# Patient Record
Sex: FEMALE | Race: WHITE | HISPANIC OR LATINO | Employment: PART TIME | URBAN - METROPOLITAN AREA
[De-identification: names, ages, dates, MRNs, and addresses within clinical notes are randomized per-mention and may not be internally consistent; named-entity substitution may affect disease eponyms.]

---

## 2017-02-06 ENCOUNTER — HOSPITAL ENCOUNTER (OUTPATIENT)
Facility: HOSPITAL | Age: 47
Discharge: HOME | End: 2017-02-07
Attending: FAMILY MEDICINE | Admitting: FAMILY MEDICINE
Payer: MEDICARE

## 2017-02-06 DIAGNOSIS — E86.0 DEHYDRATION: ICD-10-CM

## 2017-02-06 PROBLEM — R07.9 CHEST PAIN AT REST: Status: ACTIVE | Noted: 2017-02-06

## 2017-02-06 PROBLEM — D72.829 LEUKOCYTOSIS: Status: ACTIVE | Noted: 2017-02-06

## 2017-02-06 PROBLEM — I95.9 HYPOTENSION: Status: ACTIVE | Noted: 2017-02-06

## 2017-02-06 LAB
ALBUMIN SERPL BCP-MCNC: 2.9 G/DL
ALP SERPL-CCNC: 77 U/L
ALT SERPL W/O P-5'-P-CCNC: 32 U/L
ANION GAP SERPL CALC-SCNC: 13 MMOL/L
AST SERPL-CCNC: 23 U/L
BASOPHILS # BLD AUTO: 0.01 K/UL
BASOPHILS NFR BLD: 0 %
BILIRUB SERPL-MCNC: 0.3 MG/DL
BILIRUB UR QL STRIP: ABNORMAL
BUN SERPL-MCNC: 15 MG/DL
CALCIUM SERPL-MCNC: 8.2 MG/DL
CHLORIDE SERPL-SCNC: 110 MMOL/L
CK MB SERPL-MCNC: 1.8 NG/ML
CK MB SERPL-RTO: 1.9 %
CK SERPL-CCNC: 97 U/L
CLARITY UR: CLEAR
CO2 SERPL-SCNC: 11 MMOL/L
COLOR UR: YELLOW
CREAT SERPL-MCNC: 0.8 MG/DL
DIFFERENTIAL METHOD: ABNORMAL
EOSINOPHIL # BLD AUTO: 0 K/UL
EOSINOPHIL NFR BLD: 0 %
ERYTHROCYTE [DISTWIDTH] IN BLOOD BY AUTOMATED COUNT: 13.7 %
EST. GFR  (AFRICAN AMERICAN): >60 ML/MIN/1.73 M^2
EST. GFR  (NON AFRICAN AMERICAN): >60 ML/MIN/1.73 M^2
GLUCOSE SERPL-MCNC: 139 MG/DL
GLUCOSE UR QL STRIP: ABNORMAL
HCT VFR BLD AUTO: 42.7 %
HGB BLD-MCNC: 13.9 G/DL
HGB UR QL STRIP: NEGATIVE
KETONES UR QL STRIP: ABNORMAL
LDH SERPL L TO P-CCNC: 129 U/L
LDH SERPL L TO P-CCNC: 201 U/L
LEUKOCYTE ESTERASE UR QL STRIP: NEGATIVE
LYMPHOCYTES # BLD AUTO: 1.3 K/UL
LYMPHOCYTES NFR BLD: 5.6 %
MCH RBC QN AUTO: 30.6 PG
MCHC RBC AUTO-ENTMCNC: 32.6 %
MCV RBC AUTO: 94 FL
MONOCYTES # BLD AUTO: 0.9 K/UL
MONOCYTES NFR BLD: 3.9 %
NEUTROPHILS # BLD AUTO: 21.1 K/UL
NEUTROPHILS NFR BLD: 90.5 %
NITRITE UR QL STRIP: NEGATIVE
PH UR STRIP: 6 [PH] (ref 5–8)
PLATELET # BLD AUTO: 272 K/UL
PLATELET BLD QL SMEAR: ABNORMAL
PMV BLD AUTO: 10.3 FL
POCT GLUCOSE: 116 MG/DL (ref 70–110)
POCT GLUCOSE: 145 MG/DL (ref 70–110)
POTASSIUM SERPL-SCNC: 3.9 MMOL/L
PROT SERPL-MCNC: 6.1 G/DL
PROT UR QL STRIP: ABNORMAL
RBC # BLD AUTO: 4.54 M/UL
SODIUM SERPL-SCNC: 134 MMOL/L
SP GR UR STRIP: >=1.03 (ref 1–1.03)
TROPONIN I SERPL DL<=0.01 NG/ML-MCNC: <0.006 NG/ML
URN SPEC COLLECT METH UR: ABNORMAL
UROBILINOGEN UR STRIP-ACNC: NEGATIVE EU/DL
WBC # BLD AUTO: 23.53 K/UL

## 2017-02-06 PROCEDURE — 83036 HEMOGLOBIN GLYCOSYLATED A1C: CPT

## 2017-02-06 PROCEDURE — 93010 ELECTROCARDIOGRAM REPORT: CPT | Mod: ,,, | Performed by: INTERNAL MEDICINE

## 2017-02-06 PROCEDURE — 63600175 PHARM REV CODE 636 W HCPCS: Performed by: FAMILY MEDICINE

## 2017-02-06 PROCEDURE — 84484 ASSAY OF TROPONIN QUANT: CPT

## 2017-02-06 PROCEDURE — 82553 CREATINE MB FRACTION: CPT

## 2017-02-06 PROCEDURE — 94761 N-INVAS EAR/PLS OXIMETRY MLT: CPT

## 2017-02-06 PROCEDURE — G0378 HOSPITAL OBSERVATION PER HR: HCPCS

## 2017-02-06 PROCEDURE — G0379 DIRECT REFER HOSPITAL OBSERV: HCPCS

## 2017-02-06 PROCEDURE — 87040 BLOOD CULTURE FOR BACTERIA: CPT

## 2017-02-06 PROCEDURE — 25000003 PHARM REV CODE 250: Performed by: FAMILY MEDICINE

## 2017-02-06 PROCEDURE — 93005 ELECTROCARDIOGRAM TRACING: CPT

## 2017-02-06 PROCEDURE — 85025 COMPLETE CBC W/AUTO DIFF WBC: CPT

## 2017-02-06 PROCEDURE — 80053 COMPREHEN METABOLIC PANEL: CPT

## 2017-02-06 PROCEDURE — 81003 URINALYSIS AUTO W/O SCOPE: CPT

## 2017-02-06 PROCEDURE — 36415 COLL VENOUS BLD VENIPUNCTURE: CPT

## 2017-02-06 PROCEDURE — 83615 LACTATE (LD) (LDH) ENZYME: CPT

## 2017-02-06 RX ORDER — METFORMIN HYDROCHLORIDE 1000 MG/1
1000 TABLET ORAL 2 TIMES DAILY WITH MEALS
COMMUNITY

## 2017-02-06 RX ORDER — METFORMIN HYDROCHLORIDE 500 MG/1
1000 TABLET ORAL 2 TIMES DAILY WITH MEALS
Status: DISCONTINUED | OUTPATIENT
Start: 2017-02-06 | End: 2017-02-07 | Stop reason: HOSPADM

## 2017-02-06 RX ORDER — CLOZAPINE 100 MG/1
150 TABLET ORAL 2 TIMES DAILY
Status: ON HOLD | COMMUNITY
End: 2017-02-07

## 2017-02-06 RX ORDER — FENOFIBRATE 145 MG/1
145 TABLET, FILM COATED ORAL
Status: DISCONTINUED | OUTPATIENT
Start: 2017-02-07 | End: 2017-02-07 | Stop reason: HOSPADM

## 2017-02-06 RX ORDER — GLUCAGON 1 MG
1 KIT INJECTION
Status: CANCELLED | OUTPATIENT
Start: 2017-02-06

## 2017-02-06 RX ORDER — INSULIN ASPART 100 [IU]/ML
1-10 INJECTION, SOLUTION INTRAVENOUS; SUBCUTANEOUS
Status: DISCONTINUED | OUTPATIENT
Start: 2017-02-06 | End: 2017-02-07 | Stop reason: HOSPADM

## 2017-02-06 RX ORDER — GABAPENTIN 300 MG/1
300 CAPSULE ORAL 3 TIMES DAILY
Status: DISCONTINUED | OUTPATIENT
Start: 2017-02-06 | End: 2017-02-07 | Stop reason: HOSPADM

## 2017-02-06 RX ORDER — INSULIN GLARGINE 100 [IU]/ML
10 INJECTION, SOLUTION SUBCUTANEOUS
COMMUNITY

## 2017-02-06 RX ORDER — IBUPROFEN 200 MG
16 TABLET ORAL
Status: CANCELLED | OUTPATIENT
Start: 2017-02-06

## 2017-02-06 RX ORDER — ONDANSETRON 2 MG/ML
4 INJECTION INTRAMUSCULAR; INTRAVENOUS EVERY 12 HOURS PRN
Status: CANCELLED | OUTPATIENT
Start: 2017-02-06

## 2017-02-06 RX ORDER — CLOZAPINE 25 MG/1
150 TABLET ORAL 2 TIMES DAILY
Status: DISCONTINUED | OUTPATIENT
Start: 2017-02-06 | End: 2017-02-07

## 2017-02-06 RX ORDER — CITALOPRAM 40 MG/1
40 TABLET, FILM COATED ORAL DAILY
COMMUNITY

## 2017-02-06 RX ORDER — IBUPROFEN 200 MG
24 TABLET ORAL
Status: CANCELLED | OUTPATIENT
Start: 2017-02-06

## 2017-02-06 RX ORDER — PANTOPRAZOLE SODIUM 40 MG/1
40 TABLET, DELAYED RELEASE ORAL DAILY
Status: DISCONTINUED | OUTPATIENT
Start: 2017-02-06 | End: 2017-02-07 | Stop reason: HOSPADM

## 2017-02-06 RX ORDER — LOPERAMIDE HYDROCHLORIDE 2 MG/1
4 CAPSULE ORAL ONCE
Status: COMPLETED | OUTPATIENT
Start: 2017-02-06 | End: 2017-02-06

## 2017-02-06 RX ORDER — FENOFIBRATE 160 MG/1
145 TABLET ORAL
Status: ON HOLD | COMMUNITY
End: 2018-08-13 | Stop reason: HOSPADM

## 2017-02-06 RX ORDER — LOPERAMIDE HYDROCHLORIDE 2 MG/1
4 CAPSULE ORAL ONCE
Status: CANCELLED | OUTPATIENT
Start: 2017-02-06 | End: 2017-02-06

## 2017-02-06 RX ORDER — FUROSEMIDE 20 MG/1
20 TABLET ORAL DAILY
Status: DISCONTINUED | OUTPATIENT
Start: 2017-02-06 | End: 2017-02-07

## 2017-02-06 RX ORDER — METOPROLOL TARTRATE 25 MG/1
25 TABLET, FILM COATED ORAL 2 TIMES DAILY
COMMUNITY

## 2017-02-06 RX ORDER — ONDANSETRON 2 MG/ML
4 INJECTION INTRAMUSCULAR; INTRAVENOUS EVERY 12 HOURS PRN
Status: DISCONTINUED | OUTPATIENT
Start: 2017-02-06 | End: 2017-02-07

## 2017-02-06 RX ORDER — PANTOPRAZOLE SODIUM 20 MG/1
20 TABLET, DELAYED RELEASE ORAL
COMMUNITY

## 2017-02-06 RX ORDER — BENZTROPINE MESYLATE 1 MG/1
1 TABLET ORAL 2 TIMES DAILY
COMMUNITY

## 2017-02-06 RX ORDER — IBUPROFEN 200 MG
24 TABLET ORAL
Status: DISCONTINUED | OUTPATIENT
Start: 2017-02-06 | End: 2017-02-07 | Stop reason: HOSPADM

## 2017-02-06 RX ORDER — GABAPENTIN 300 MG/1
300 CAPSULE ORAL 3 TIMES DAILY
COMMUNITY

## 2017-02-06 RX ORDER — CITALOPRAM 20 MG/1
40 TABLET, FILM COATED ORAL DAILY
Status: DISCONTINUED | OUTPATIENT
Start: 2017-02-07 | End: 2017-02-07 | Stop reason: HOSPADM

## 2017-02-06 RX ORDER — BENZTROPINE MESYLATE 1 MG/1
1 TABLET ORAL 2 TIMES DAILY
Status: DISCONTINUED | OUTPATIENT
Start: 2017-02-06 | End: 2017-02-07 | Stop reason: HOSPADM

## 2017-02-06 RX ORDER — GLUCAGON 1 MG
1 KIT INJECTION
Status: DISCONTINUED | OUTPATIENT
Start: 2017-02-06 | End: 2017-02-07 | Stop reason: HOSPADM

## 2017-02-06 RX ORDER — ATORVASTATIN CALCIUM 40 MG/1
40 TABLET, FILM COATED ORAL DAILY
Status: DISCONTINUED | OUTPATIENT
Start: 2017-02-06 | End: 2017-02-07 | Stop reason: HOSPADM

## 2017-02-06 RX ORDER — IBUPROFEN 200 MG
16 TABLET ORAL
Status: DISCONTINUED | OUTPATIENT
Start: 2017-02-06 | End: 2017-02-07 | Stop reason: HOSPADM

## 2017-02-06 RX ORDER — LISINOPRIL 10 MG/1
10 TABLET ORAL DAILY
Status: DISCONTINUED | OUTPATIENT
Start: 2017-02-06 | End: 2017-02-07

## 2017-02-06 RX ORDER — FENOFIBRATE 145 MG/1
145 TABLET, FILM COATED ORAL DAILY
Status: DISCONTINUED | OUTPATIENT
Start: 2017-02-06 | End: 2017-02-06

## 2017-02-06 RX ORDER — LISINOPRIL 10 MG/1
10 TABLET ORAL DAILY
Status: ON HOLD | COMMUNITY
End: 2017-02-07 | Stop reason: HOSPADM

## 2017-02-06 RX ORDER — CLOZAPINE 25 MG/1
25 TABLET ORAL NIGHTLY
Status: DISCONTINUED | OUTPATIENT
Start: 2017-02-06 | End: 2017-02-06

## 2017-02-06 RX ORDER — METOPROLOL TARTRATE 25 MG/1
25 TABLET, FILM COATED ORAL DAILY
Status: DISCONTINUED | OUTPATIENT
Start: 2017-02-06 | End: 2017-02-07 | Stop reason: HOSPADM

## 2017-02-06 RX ORDER — GLUCAGON 1 MG
1 KIT INJECTION
Status: DISCONTINUED | OUTPATIENT
Start: 2017-02-06 | End: 2017-02-06 | Stop reason: SDUPTHER

## 2017-02-06 RX ORDER — ATORVASTATIN CALCIUM 40 MG/1
40 TABLET, FILM COATED ORAL NIGHTLY
COMMUNITY

## 2017-02-06 RX ORDER — FUROSEMIDE 20 MG/1
20 TABLET ORAL DAILY
Status: ON HOLD | COMMUNITY
End: 2017-02-07 | Stop reason: HOSPADM

## 2017-02-06 RX ORDER — BUTALBITAL, ACETAMINOPHEN AND CAFFEINE 50; 325; 40 MG/1; MG/1; MG/1
1 TABLET ORAL EVERY 6 HOURS PRN
Status: DISCONTINUED | OUTPATIENT
Start: 2017-02-06 | End: 2017-02-07 | Stop reason: HOSPADM

## 2017-02-06 RX ORDER — SODIUM CHLORIDE 9 MG/ML
INJECTION, SOLUTION INTRAVENOUS CONTINUOUS
Status: ACTIVE | OUTPATIENT
Start: 2017-02-06 | End: 2017-02-07

## 2017-02-06 RX ORDER — SODIUM CHLORIDE 9 MG/ML
INJECTION, SOLUTION INTRAVENOUS CONTINUOUS
Status: DISCONTINUED | OUTPATIENT
Start: 2017-02-06 | End: 2017-02-07 | Stop reason: HOSPADM

## 2017-02-06 RX ADMIN — LOPERAMIDE HYDROCHLORIDE 4 MG: 2 CAPSULE ORAL at 02:02

## 2017-02-06 RX ADMIN — LISINOPRIL 10 MG: 10 TABLET ORAL at 02:02

## 2017-02-06 RX ADMIN — SODIUM CHLORIDE: 0.9 INJECTION, SOLUTION INTRAVENOUS at 10:02

## 2017-02-06 RX ADMIN — BUTALBITAL, ACETAMINOPHEN, AND CAFFEINE 1 TABLET: 50; 325; 40 TABLET ORAL at 10:02

## 2017-02-06 RX ADMIN — SODIUM CHLORIDE 500 ML: 0.9 INJECTION, SOLUTION INTRAVENOUS at 06:02

## 2017-02-06 RX ADMIN — PANTOPRAZOLE SODIUM 40 MG: 40 TABLET, DELAYED RELEASE ORAL at 02:02

## 2017-02-06 RX ADMIN — SODIUM CHLORIDE 500 ML: 0.9 INJECTION, SOLUTION INTRAVENOUS at 02:02

## 2017-02-06 RX ADMIN — INSULIN DETEMIR 10 UNITS: 100 INJECTION, SOLUTION SUBCUTANEOUS at 10:02

## 2017-02-06 RX ADMIN — GABAPENTIN 300 MG: 300 CAPSULE ORAL at 10:02

## 2017-02-06 RX ADMIN — SODIUM CHLORIDE: 0.9 INJECTION, SOLUTION INTRAVENOUS at 06:02

## 2017-02-06 RX ADMIN — ATORVASTATIN CALCIUM 40 MG: 40 TABLET, FILM COATED ORAL at 02:02

## 2017-02-06 RX ADMIN — BENZTROPINE MESYLATE 1 MG: 1 TABLET ORAL at 09:02

## 2017-02-06 RX ADMIN — METOPROLOL TARTRATE 25 MG: 25 TABLET ORAL at 02:02

## 2017-02-06 RX ADMIN — METFORMIN HYDROCHLORIDE 1000 MG: 500 TABLET, FILM COATED ORAL at 06:02

## 2017-02-06 RX ADMIN — BUTALBITAL, ACETAMINOPHEN, AND CAFFEINE 1 TABLET: 50; 325; 40 TABLET ORAL at 03:02

## 2017-02-06 RX ADMIN — FUROSEMIDE 20 MG: 20 TABLET ORAL at 02:02

## 2017-02-06 RX ADMIN — GABAPENTIN 300 MG: 300 CAPSULE ORAL at 03:02

## 2017-02-06 NOTE — H&P
"[facility]  History & Physical    Subjective: "I am dizzy, my stomach hurts, I have diatrrhea and I am not able to keep anything down"       Chief Complaint/Reason for Admission: Unable to keep food and drink down dehydrated, chest pain, intermittent, 5/10, lasting 10 min, on rest. Diarrhea, nausea, vomiting.    Therese Ann is a 46 y.o. female with h/o DM on Lantus and Metformin, history of developmental delay. Insulin swince 2015. DM complications: PVD, neuropathy, and CAD    No past medical history on file.DM, HTN, hyperlipidaemia, MV prolapse, Checkenpox, blood transfusions,   No past surgical history on file.  No family history on file.Cancer, stroke, DM, CAD  Social History   Substance Use Topics    Smoking status: Not on file    Smokeless tobacco: Not on file    Alcohol use Not on file       No prescriptions prior to admission.     Review of patient's allergies indicates:  Allergies not on file     Review of Systems   Constitutional: Positive for diaphoresis, fever and malaise/fatigue.   Respiratory: Negative.    Cardiovascular: Positive for chest pain and palpitations.   Gastrointestinal: Positive for abdominal pain, diarrhea, nausea and vomiting.   Genitourinary: Negative.    Musculoskeletal: Negative.    Neurological: Positive for dizziness and weakness. Negative for tingling, tremors, sensory change, speech change, focal weakness, seizures and loss of consciousness.   Endo/Heme/Allergies: Negative.    Psychiatric/Behavioral: Negative for depression, hallucinations, substance abuse and suicidal ideas. The patient is not nervous/anxious and does not have insomnia.        Objective:      Vital Signs (Most Recent)       Vital Signs Range (Last 24H):  BP: ()/()   Arterial Line BP: ()/()     Physical Exam   Constitutional: She is oriented to person, place, and time. She appears well-developed and well-nourished.   HENT:   Head: Normocephalic and atraumatic.   Eyes: EOM are normal. " Pupils are equal, round, and reactive to light.   Neck: Normal range of motion. Neck supple.   Cardiovascular: Regular rhythm, normal heart sounds, intact distal pulses and normal pulses.  Tachycardia present.    Abdominal: Soft. Bowel sounds are normal. There is tenderness in the periumbilical area.       Musculoskeletal: Normal range of motion.   Neurological: She is alert and oriented to person, place, and time. She displays a negative Romberg sign.   Reflex Scores:       Tricep reflexes are 0 on the right side and 0 on the left side.       Bicep reflexes are 0 on the right side and 0 on the left side.       Brachioradialis reflexes are 1+ on the right side and 1+ on the left side.       Patellar reflexes are 1+ on the right side and 1+ on the left side.       Achilles reflexes are 1+ on the right side and 1+ on the left side.  Skin: Skin is warm. She is diaphoretic.   Psychiatric: She has a normal mood and affect. Her behavior is normal. Judgment and thought content normal.   Vitals reviewed.      Data Review:    CBC:   Lab Results   Component Value Date    WBC 10.43 09/29/2004    RBC 4.35 09/29/2004    HGB 13.6 09/29/2004    HCT 39.9 09/29/2004     09/29/2004     BMP:   Lab Results   Component Value Date     (H) 09/29/2004     09/29/2004    K 4.5 09/29/2004     09/29/2004    CO2 20 (L) 09/29/2004    BUN 12 09/29/2004    CREATININE 0.7 09/29/2004    CALCIUM 9.7 09/29/2004      ECG: normal sinus rhythm, no blocks or conduction defects, no ischemic changes. Pending    Assessment:      There are no hospital problems to display for this patient.      Plan:    Admit for observation, CE q8 with 12 lead ECG, rehydrate, DM controll, antispasmodics and pain controll, vitals stabilization

## 2017-02-06 NOTE — IP AVS SNAPSHOT
[facility]  [address]  [place] [address]  Phone: [phone]           Patient Discharge Instructions     Our goal is to set you up for success. This packet includes information on your condition, medications, and your home care. It will help you to care for yourself so you don't get sicker and need to go back to the hospital.     Please ask your nurse if you have any questions.        There are many details to remember when preparing to leave the hospital. Here is what you will need to do:    1. Take your medicine. If you are prescribed medications, review your Medication List in the following pages. You may have new medications to  at the pharmacy and others that you'll need to stop taking. Review the instructions for how and when to take your medications. Talk with your doctor or nurses if you are unsure of what to do.     2. Go to your follow-up appointments. Specific follow-up information is listed in the following pages. Your may be contacted by a transition nurse or clinical provider about future appointments. Be sure we have all of the phone numbers to reach you, if needed. Please contact your provider's office if you are unable to make an appointment.     3. Watch for warning signs. Your doctor or nurse will give you detailed warning signs to watch for and when to call for assistance. These instructions may also include educational information about your condition. If you experience any of warning signs to your health, call your doctor.               ** Verify the list of medication(s) below is accurate and up to date. Carry this with you in case of emergency. If your medications have changed, please notify your healthcare provider.             Medication List      START taking these medications        Additional Info                      butalbital-acetaminophen-caffeine -40 mg -40 mg per tablet   Commonly known as:  FIORICET, ESGIC   Quantity:  30 tablet    Refills:  0   Dose:  1 tablet    Last time this was given:  1 tablet on 2/7/2017 11:26 AM   Instructions:  Take 1 tablet by mouth every 6 (six) hours as needed for Headaches.     Begin Date    AM    Noon    PM    Bedtime       ondansetron 4 MG Tbdl   Commonly known as:  ZOFRAN-ODT   Quantity:  20 tablet   Refills:  0   Dose:  4 mg    Instructions:  Take 1 tablet (4 mg total) by mouth every 6 (six) hours as needed.     Begin Date    AM    Noon    PM    Bedtime         CHANGE how you take these medications        Additional Info                      * clozapine 100 MG Tab   Commonly known as:  CLOZARIL   Quantity:  60 tablet   Refills:  1   Dose:  100 mg   What changed:  how much to take    Instructions:  Take 1 tablet (100 mg total) by mouth 2 (two) times daily.     Begin Date    AM    Noon    PM    Bedtime       * clozapine 100 MG Tab   Commonly known as:  CLOZARIL   Quantity:  60 tablet   Refills:  11   Dose:  100 mg   What changed:  You were already taking a medication with the same name, and this prescription was added. Make sure you understand how and when to take each.    Instructions:  Take 1 tablet (100 mg total) by mouth 2 (two) times daily.     Begin Date    AM    Noon    PM    Bedtime       lisinopril 5 MG tablet   Commonly known as:  PRINIVIL,ZESTRIL   Quantity:  90 tablet   Refills:  3   Dose:  5 mg   What changed:    - medication strength  - how much to take    Start Date:  2/8/2017   Last time this was given:  10 mg on 2/6/2017  2:30 PM   Instructions:  Take 1 tablet (5 mg total) by mouth once daily.     Begin Date    AM    Noon    PM    Bedtime       * Notice:  This list has 2 medication(s) that are the same as other medications prescribed for you. Read the directions carefully, and ask your doctor or other care provider to review them with you.      CONTINUE taking these medications        Additional Info                      atorvastatin 40 MG tablet   Commonly known as:  LIPITOR   Refills:  0    Dose:  40 mg    Last time this was given:  40 mg on 2/7/2017  8:49 AM   Instructions:  Take 40 mg by mouth every evening.     Begin Date    AM    Noon    PM    Bedtime       benztropine 1 MG tablet   Commonly known as:  COGENTIN   Refills:  0   Dose:  1 mg    Last time this was given:  1 mg on 2/7/2017  8:49 AM   Instructions:  Take 1 mg by mouth 2 (two) times daily.     Begin Date    AM    Noon    PM    Bedtime       citalopram 40 MG tablet   Commonly known as:  CELEXA   Refills:  0   Dose:  40 mg    Last time this was given:  40 mg on 2/7/2017  8:49 AM   Instructions:  Take 40 mg by mouth once daily.     Begin Date    AM    Noon    PM    Bedtime       fenofibrate 160 MG Tab   Refills:  0   Dose:  145 mg    Instructions:  Take 145 mg by mouth daily with lunch.     Begin Date    AM    Noon    PM    Bedtime       gabapentin 300 MG capsule   Commonly known as:  NEURONTIN   Refills:  0   Dose:  300 mg    Last time this was given:  300 mg on 2/7/2017  5:24 AM   Instructions:  Take 300 mg by mouth 3 (three) times daily.     Begin Date    AM    Noon    PM    Bedtime       insulin glargine 100 unit/mL injection   Commonly known as:  LANTUS   Refills:  0   Dose:  10 Units    Instructions:  Inject 10 Units into the skin before breakfast.     Begin Date    AM    Noon    PM    Bedtime       metformin 1000 MG tablet   Commonly known as:  GLUCOPHAGE   Refills:  0   Dose:  1000 mg    Last time this was given:  1,000 mg on 2/7/2017  8:48 AM   Instructions:  Take 1,000 mg by mouth 2 (two) times daily with meals.     Begin Date    AM    Noon    PM    Bedtime       metoprolol tartrate 25 MG tablet   Commonly known as:  LOPRESSOR   Refills:  0   Dose:  25 mg    Last time this was given:  25 mg on 2/6/2017  2:30 PM   Instructions:  Take 25 mg by mouth 2 (two) times daily.     Begin Date    AM    Noon    PM    Bedtime       pantoprazole 20 MG tablet   Commonly known as:  PROTONIX   Refills:  0   Dose:  20 mg    Last time this was  given:  40 mg on 2/7/2017  8:49 AM   Instructions:  Take 20 mg by mouth before breakfast.     Begin Date    AM    Noon    PM    Bedtime         STOP taking these medications     furosemide 20 MG tablet   Commonly known as:  LASIX            Where to Get Your Medications      These medications were sent to [facility] - [place] - [address] [facility].  [address] [place] [address]., [place] [address]     Phone:  [phone]     butalbital-acetaminophen-caffeine -40 mg -40 mg per tablet    clozapine 100 MG Tab    clozapine 100 MG Tab    lisinopril 5 MG tablet    ondansetron 4 MG Tbdl                  Please bring to all follow up appointments:    1. A copy of your discharge instructions.  2. All medicines you are currently taking in their original bottles.  3. Identification and insurance card.    Please arrive 15 minutes ahead of scheduled appointment time.    Please call 24 hours in advance if you must reschedule your appointment and/or time.        Follow-up Information     Follow up with Yaniv Mehta MD On 2/10/2017.    Specialty:  Family Medicine    Why:  11:30 am         Contact information:    [address] [facility]  [address]  [place] [address]  [phone]          Discharge Instructions     Future Orders    Activity as tolerated     Diet general     Comments:    Diabetic, Cardiac diet, low salt diet    Questions:    Total calories:  1800 Calorie    Fat restriction, if any:      Protein restriction, if any:      Na restriction, if any:      Fluid restriction:      Additional restrictions:      No dressing needed         Primary Diagnosis     Your primary diagnosis was:  Dehydration      Admission Information     Date & Time Provider Department [facility]    2/6/2017 11:51 AM Yaniv Webster MD [facility] [id number]      Care Providers     Provider Role Specialty Primary office phone    Yaniv Webster MD Attending Provider Family  "Medicine [phone]    Cynthia Thomas MD Consulting Physician  Psychiatry [phone]      Your Vitals Were     BP Pulse Temp Resp Height Weight    94/51 (BP Location: Left arm, Patient Position: Lying, BP Method: Automatic) 95 97.6 °F (36.4 °C) (Oral) 18 5' 2" (1.575 m) 78.9 kg (174 lb)    SpO2 BMI             96% 31.83 kg/m2         Recent Lab Values        2/6/2017                          12:52 PM           A1C 9.6 (H)           Comment for A1C at 12:52 PM on 2/6/2017:  According to ADA guidelines, hemoglobin A1C <7.0% represents  optimal control in non-pregnant diabetic patients.  Different  metrics may apply to specific populations.   Standards of Medical Care in Diabetes - 2016.  For the purpose of screening for the presence of diabetes:  <5.7%     Consistent with the absence of diabetes  5.7-6.4%  Consistent with increasing risk for diabetes   (prediabetes)  >or=6.5%  Consistent with diabetes  Currently no consensus exists for use of hemoglobin A1C  for diagnosis of diabetes for children.        Pending Labs     Order Current Status    Blood culture (site 1) Preliminary result      Allergies as of 2/7/2017     No Known Allergies      [facility]     [facility] Nurse Care Line - 24/7 Assistance  Unless otherwise directed by your provider, please contact [facility], our nurse care line that is available for 24/7 assistance.     Registered nurses in the [facility] provide clinical advisement, health education, appointment booking, and other advisory services.  Call for this free service at [phone].        Advance Directives     An advance directive is a document which, in the event you are no longer able to make decisions for yourself, tells your healthcare team what kind of treatment you do or do not want to receive, or who you would like to make those decisions for you.  If you do not currently have an advance directive, [facility] encourages you to create one.  For more " information call:  [phone]), [phone] ([phone]),  or log on to [email or web address].        Smoking Cessation     If you would like to quit smoking:   You may be eligible for free services if you are a [place] resident and started smoking cigarettes before September 1, 1988.  Call the Smoking Cessation Trust (SCT) toll free at (949) 964-9622 or (075) 270-7297.   Call 4-216-QUIT-NOW if you do not meet the above criteria.            Language Assistance Services     ATTENTION: Language assistance services are available, free of charge. Please call [phone].      ATENCIÓN: Si lexy noe, tiene a ortega disposición servicios gratuitos de asistencia lingüística. Llame al [phone].     CHÚ Ý: N?u b?n nói Ti?ng Vi?t, có các d?ch v? h? tr? ngôn ng? mi?n phí dành cho b?n. G?i s? [phone].        [facility] Sign-Up     Activating your [facility] account is as easy as 1-2-3!     1) Visit [email or web address], select Sign Up Now, enter this activation code and your date of birth, then select Next.  [email or web address]  Expires: 3/24/2017 12:45 PM      2) Create a username and password to use when you visit [facility] in the future and select a security question in case you lose your password and select Next.    3) Enter your e-mail address and click Sign Up!    Additional Information  If you have questions, please e-mail [email or web address] or call [phone] to talk to our [facility] staff. Remember, [facility] is NOT to be used for urgent needs. For medical emergencies, dial 911.          [facility] complies with applicable Federal civil rights laws and does not discriminate on the basis of race, color, national origin, age, disability, or sex.

## 2017-02-07 VITALS
HEART RATE: 54 BPM | BODY MASS INDEX: 32.02 KG/M2 | HEIGHT: 62 IN | OXYGEN SATURATION: 97 % | TEMPERATURE: 98 F | WEIGHT: 174 LBS | DIASTOLIC BLOOD PRESSURE: 66 MMHG | RESPIRATION RATE: 18 BRPM | SYSTOLIC BLOOD PRESSURE: 144 MMHG

## 2017-02-07 PROBLEM — D72.829 LEUKOCYTOSIS: Status: RESOLVED | Noted: 2017-02-06 | Resolved: 2017-02-07

## 2017-02-07 PROBLEM — E86.0 DEHYDRATION: Status: RESOLVED | Noted: 2017-02-06 | Resolved: 2017-02-07

## 2017-02-07 LAB
BASOPHILS # BLD AUTO: 0 K/UL
BASOPHILS NFR BLD: 0 %
CK MB SERPL-MCNC: 1.3 NG/ML
CK MB SERPL-MCNC: 1.5 NG/ML
CK MB SERPL-RTO: 1.9 %
CK MB SERPL-RTO: 1.9 %
CK SERPL-CCNC: 70 U/L
CK SERPL-CCNC: 79 U/L
DIFFERENTIAL METHOD: ABNORMAL
EOSINOPHIL # BLD AUTO: 0 K/UL
EOSINOPHIL NFR BLD: 0.1 %
ERYTHROCYTE [DISTWIDTH] IN BLOOD BY AUTOMATED COUNT: 14.2 %
ESTIMATED AVG GLUCOSE: 229 MG/DL
HBA1C MFR BLD HPLC: 9.6 %
HCT VFR BLD AUTO: 36.3 %
HGB BLD-MCNC: 11.8 G/DL
LDH SERPL L TO P-CCNC: 138 U/L
LYMPHOCYTES # BLD AUTO: 0.8 K/UL
LYMPHOCYTES NFR BLD: 8.6 %
MAGNESIUM SERPL-MCNC: 2.2 MG/DL
MCH RBC QN AUTO: 30.2 PG
MCHC RBC AUTO-ENTMCNC: 32.5 %
MCV RBC AUTO: 93 FL
MONOCYTES # BLD AUTO: 0.8 K/UL
MONOCYTES NFR BLD: 7.9 %
NEUTROPHILS # BLD AUTO: 7.9 K/UL
NEUTROPHILS NFR BLD: 83.4 %
PHOSPHATE SERPL-MCNC: 1.5 MG/DL
PLATELET # BLD AUTO: 227 K/UL
PLATELET BLD QL SMEAR: ABNORMAL
PMV BLD AUTO: 10.6 FL
POCT GLUCOSE: 115 MG/DL (ref 70–110)
POCT GLUCOSE: 155 MG/DL (ref 70–110)
POCT GLUCOSE: 97 MG/DL (ref 70–110)
RBC # BLD AUTO: 3.91 M/UL
TROPONIN I SERPL DL<=0.01 NG/ML-MCNC: <0.006 NG/ML
TROPONIN I SERPL DL<=0.01 NG/ML-MCNC: <0.006 NG/ML
WBC # BLD AUTO: 9.48 K/UL

## 2017-02-07 PROCEDURE — 25000003 PHARM REV CODE 250: Performed by: FAMILY MEDICINE

## 2017-02-07 PROCEDURE — 84484 ASSAY OF TROPONIN QUANT: CPT | Mod: 91

## 2017-02-07 PROCEDURE — 84100 ASSAY OF PHOSPHORUS: CPT

## 2017-02-07 PROCEDURE — 94761 N-INVAS EAR/PLS OXIMETRY MLT: CPT

## 2017-02-07 PROCEDURE — G0378 HOSPITAL OBSERVATION PER HR: HCPCS

## 2017-02-07 PROCEDURE — 36415 COLL VENOUS BLD VENIPUNCTURE: CPT

## 2017-02-07 PROCEDURE — 83615 LACTATE (LD) (LDH) ENZYME: CPT

## 2017-02-07 PROCEDURE — 82553 CREATINE MB FRACTION: CPT

## 2017-02-07 PROCEDURE — 63600175 PHARM REV CODE 636 W HCPCS: Performed by: FAMILY MEDICINE

## 2017-02-07 PROCEDURE — 83735 ASSAY OF MAGNESIUM: CPT

## 2017-02-07 PROCEDURE — 85025 COMPLETE CBC W/AUTO DIFF WBC: CPT

## 2017-02-07 PROCEDURE — 82553 CREATINE MB FRACTION: CPT | Mod: 91

## 2017-02-07 PROCEDURE — 93005 ELECTROCARDIOGRAM TRACING: CPT

## 2017-02-07 PROCEDURE — 93010 ELECTROCARDIOGRAM REPORT: CPT | Mod: ,,, | Performed by: INTERNAL MEDICINE

## 2017-02-07 PROCEDURE — 84484 ASSAY OF TROPONIN QUANT: CPT

## 2017-02-07 RX ORDER — LISINOPRIL 5 MG/1
5 TABLET ORAL DAILY
Qty: 90 TABLET | Refills: 3 | Status: SHIPPED | OUTPATIENT
Start: 2017-02-08 | End: 2018-10-01

## 2017-02-07 RX ORDER — CLOZAPINE 100 MG/1
100 TABLET ORAL 2 TIMES DAILY
Qty: 60 TABLET | Refills: 11 | Status: SHIPPED | OUTPATIENT
Start: 2017-02-07 | End: 2018-02-07

## 2017-02-07 RX ORDER — ONDANSETRON 4 MG/1
4 TABLET, ORALLY DISINTEGRATING ORAL EVERY 6 HOURS PRN
Qty: 20 TABLET | Refills: 0 | Status: SHIPPED | OUTPATIENT
Start: 2017-02-07

## 2017-02-07 RX ORDER — CLOZAPINE 25 MG/1
150 TABLET ORAL NIGHTLY
Status: DISCONTINUED | OUTPATIENT
Start: 2017-02-07 | End: 2017-02-07 | Stop reason: HOSPADM

## 2017-02-07 RX ORDER — CLOZAPINE 25 MG/1
100 TABLET ORAL 2 TIMES DAILY
Status: DISCONTINUED | OUTPATIENT
Start: 2017-02-07 | End: 2017-02-07 | Stop reason: HOSPADM

## 2017-02-07 RX ORDER — LISINOPRIL 5 MG/1
5 TABLET ORAL DAILY
Status: DISCONTINUED | OUTPATIENT
Start: 2017-02-08 | End: 2017-02-07 | Stop reason: HOSPADM

## 2017-02-07 RX ORDER — BUTALBITAL, ACETAMINOPHEN AND CAFFEINE 50; 325; 40 MG/1; MG/1; MG/1
1 TABLET ORAL EVERY 6 HOURS PRN
Qty: 30 TABLET | Refills: 0 | Status: SHIPPED | OUTPATIENT
Start: 2017-02-07 | End: 2017-03-09

## 2017-02-07 RX ORDER — ONDANSETRON 4 MG/1
4 TABLET, ORALLY DISINTEGRATING ORAL EVERY 6 HOURS PRN
Status: DISCONTINUED | OUTPATIENT
Start: 2017-02-07 | End: 2017-02-07 | Stop reason: HOSPADM

## 2017-02-07 RX ORDER — CLOZAPINE 100 MG/1
100 TABLET ORAL 2 TIMES DAILY
Qty: 60 TABLET | Refills: 1 | Status: SHIPPED | OUTPATIENT
Start: 2017-02-07

## 2017-02-07 RX ADMIN — BUTALBITAL, ACETAMINOPHEN, AND CAFFEINE 1 TABLET: 50; 325; 40 TABLET ORAL at 11:02

## 2017-02-07 RX ADMIN — ATORVASTATIN CALCIUM 40 MG: 40 TABLET, FILM COATED ORAL at 08:02

## 2017-02-07 RX ADMIN — FENOFIBRATE 145 MG: 145 TABLET ORAL at 11:02

## 2017-02-07 RX ADMIN — CITALOPRAM HYDROBROMIDE 40 MG: 20 TABLET ORAL at 08:02

## 2017-02-07 RX ADMIN — BENZTROPINE MESYLATE 1 MG: 1 TABLET ORAL at 08:02

## 2017-02-07 RX ADMIN — BUTALBITAL, ACETAMINOPHEN, AND CAFFEINE 1 TABLET: 50; 325; 40 TABLET ORAL at 05:02

## 2017-02-07 RX ADMIN — SODIUM CHLORIDE 125 ML/HR: 0.9 INJECTION, SOLUTION INTRAVENOUS at 05:02

## 2017-02-07 RX ADMIN — PANTOPRAZOLE SODIUM 40 MG: 40 TABLET, DELAYED RELEASE ORAL at 08:02

## 2017-02-07 RX ADMIN — GABAPENTIN 300 MG: 300 CAPSULE ORAL at 05:02

## 2017-02-07 RX ADMIN — METFORMIN HYDROCHLORIDE 1000 MG: 500 TABLET, FILM COATED ORAL at 08:02

## 2017-02-07 RX ADMIN — ONDANSETRON 4 MG: 2 INJECTION INTRAMUSCULAR; INTRAVENOUS at 09:02

## 2017-02-07 NOTE — PLAN OF CARE
[place] met with pt and son Canelo Castellano  [phone]   pt independent prior to admit, drives     lives with son     f/u with Dr. Webster  2/10/16 -- 11:30 am       pt states she regularly checks blood sugar - insulin is expensive but pt is able to buy it - advised to check prices at [facility] for generic Relion.       gave drug discount card   pt has ride to home today         02/07/17 1152   Discharge Assessment   Assessment Type Discharge Planning Assessment   Confirmed/corrected address and phone number on facesheet? Yes   Assessment information obtained from? Patient;Medical Record   Expected Length of Stay (days) 1   Communicated expected length of stay with patient/caregiver yes   Type of Healthcare Directive Received (no lw    )   Prior to hospitilization cognitive status: Alert/Oriented   Prior to hospitalization functional status: Independent   Current cognitive status: Alert/Oriented   Current Functional Status: Independent   Arrived From home or self-care   Lives With child(jamie), adult  (son Canelo Castellano   [phone] )   Able to Return to Prior Arrangements no   Is patient able to care for self after discharge? Yes   How many people do you have in your home that can help with your care after discharge? 1   Who are your caregiver(s) and their phone number(s)? (leslie Lemos    )   Patient's perception of discharge disposition home or selfcare   Readmission Within The Last 30 Days no previous admission in last 30 days   Patient currently being followed by outpatient case management? No   Patient currently receives home health services? No   Does the patient currently use HME? Yes   Equipment Currently Used at Home cane, straight   Do you have any problems affording any of your prescribed medications? No  (states insulin is expensive - gave discount card    )   Is the patient taking medications as prescribed? yes   Do you have any financial concerns preventing you from receiving the healthcare you need? No   Does the  patient have transportation to healthcare appointments? Yes   Transportation Available family or friend will provide;car   On Dialysis? No   Does the patient receive services at the Coumadin Clinic? No   Are there any open cases? No   Discharge Plan A Home;Home with family   Discharge Plan B Home;Home with family;Home Health   Patient/Family In Agreement With Plan yes

## 2017-02-07 NOTE — DISCHARGE SUMMARY
[facility] Hospital Medicine  Discharge Summary      Patient Name: Therese Ann  MRN: [id number]  Admission Date: 2/6/2017  Hospital Length of Stay: 0 days  Discharge Date and Time:  02/07/2017 10:18 AM  Attending Physician: Evita Bonds, *   Discharging Provider: Evita Mehta MD  Primary Care Provider: Evita Mehta MD      HPI:  Admitted for dehydration-, hemoconcentration, hyponanatraemia, tachycardia, hypotension. Rehydrated, leucocytosis improved from 20 to 9,  Clozapine can cause hypotension, Psych consulted for dose adjustment, so far I cut down from 150 bid to 100 mg po bid but Psych will see and decide further.        * No surgery found *      Indwelling Lines/Drains at time of discharge:   Lines/Drains/Airways          No matching active lines, drains, or airways        Hospital Course:         Consults:   Consults         Status Ordering Provider     Inpatient consult to Diabetes educator  Once     Provider:  (Not yet assigned)    Acknowledged EVITA BONDS     Inpatient consult to Psychiatry  Once     Provider:  Cnythia Thomas MD    Acknowledged EVITA BONDS     IP consult to case management  Once     Provider:  (Not yet assigned)    EVITA Velez          Significant Diagnostic Studies: Labs:   CMP   Recent Labs  Lab 02/06/17  1938   *   K 3.9      CO2 11*   *   BUN 15   CREATININE 0.8   CALCIUM 8.2*   PROT 6.1   ALBUMIN 2.9*   BILITOT 0.3   ALKPHOS 77   AST 23   ALT 32   ANIONGAP 13   ESTGFRAFRICA >60   EGFRNONAA >60   , CBC   Recent Labs  Lab 02/06/17  1252 02/07/17  0650   WBC 23.53* 9.48   HGB 13.9 11.8*   HCT 42.7 36.3*    227   , Lipid Panel No results found for: CHOL, HDL, LDLCALC, TRIG, CHOLHDL, Troponin   Recent Labs  Lab 02/07/17  0650   TROPONINI <0.006   , A1C:pending and All labs within the past 24 hours have been reviewed  Microbiology:   Blood Culture   Lab Results    Component Value Date    LABBLOO No Growth to date 02/06/2017     Cardiac Graphics: ECG: sinus tachycardia    Pending Diagnostic Studies:     Procedure Component Value Units Date/Time    CK-MB [[id number]] Collected:  02/07/17 0937    Order Status:  Sent Lab Status:  In process Updated:  02/07/17 0956    Specimen:  Blood from Blood     Hemoglobin A1c [[id number]] Collected:  02/06/17 1252    Order Status:  Sent Lab Status:  In process Updated:  02/06/17 1712    Specimen:  Blood from Blood         Final Active Diagnoses:    Diagnosis Date Noted POA    Hypotension [I95.9] 02/06/2017 Yes      Problems Resolved During this Admission:    Diagnosis Date Noted Date Resolved POA    PRINCIPAL PROBLEM:  Dehydration [E86.0] 02/06/2017 02/07/2017 Yes    Leukocytosis [D72.829] 02/06/2017 02/07/2017 Yes      No new Assessment & Plan notes have been filed under this hospital service since the last note was generated.  Service: Hospital Medicine      Discharged Condition: good    Disposition: Home or Self Care    Follow Up:  Follow-up Information     Follow up with Yaniv Mehta MD In 3 days.    Specialty:  Family Medicine    Contact information:    [address] [facility]  [address] [place] [address] [id number] [phone]          Patient Instructions:     Diet general   Order Comments: Diabetic, Cardiac diet, low salt diet   Order Specific Question Answer Comments   Total calories: 1800 Calorie      Activity as tolerated     No dressing needed       Medications:  Reconciled Home Medications:   Current Discharge Medication List      START taking these medications    Details   butalbital-acetaminophen-caffeine -40 mg (FIORICET, ESGIC) -40 mg per tablet Take 1 tablet by mouth every 6 (six) hours as needed for Headaches.  Qty: 30 tablet, Refills: 0      ondansetron (ZOFRAN-ODT) 4 MG TbDL Take 1 tablet (4 mg total) by mouth every 6 (six) hours as needed.  Qty: 20 tablet, Refills: 0         CONTINUE these medications which  have CHANGED    Details   !! clozapine (CLOZARIL) 100 MG Tab Take 1 tablet (100 mg total) by mouth 2 (two) times daily.  Qty: 60 tablet, Refills: 1      !! clozapine (CLOZARIL) 100 MG Tab Take 1 tablet (100 mg total) by mouth 2 (two) times daily.  Qty: 60 tablet, Refills: 11      lisinopril (PRINIVIL,ZESTRIL) 5 MG tablet Take 1 tablet (5 mg total) by mouth once daily.  Qty: 90 tablet, Refills: 3       !! - Potential duplicate medications found. Please discuss with provider.      CONTINUE these medications which have NOT CHANGED    Details   atorvastatin (LIPITOR) 40 MG tablet Take 40 mg by mouth every evening.      benztropine (COGENTIN) 1 MG tablet Take 1 mg by mouth 2 (two) times daily.      citalopram (CELEXA) 40 MG tablet Take 40 mg by mouth once daily.      fenofibrate 160 MG Tab Take 145 mg by mouth daily with lunch.      gabapentin (NEURONTIN) 300 MG capsule Take 300 mg by mouth 3 (three) times daily.      insulin glargine (LANTUS) 100 unit/mL injection Inject 10 Units into the skin before breakfast.      metformin (GLUCOPHAGE) 1000 MG tablet Take 1,000 mg by mouth 2 (two) times daily with meals.      metoprolol tartrate (LOPRESSOR) 25 MG tablet Take 25 mg by mouth 2 (two) times daily.      pantoprazole (PROTONIX) 20 MG tablet Take 20 mg by mouth before breakfast.         STOP taking these medications       furosemide (LASIX) 20 MG tablet Comments:   Reason for Stopping:             Time spent on the discharge of patient: 30 minutes    Yaniv Mehta MD  Department of Hospital Medicine  [facility]

## 2017-02-07 NOTE — PLAN OF CARE
Problem: Patient Care Overview  Goal: Plan of Care Review  Outcome: Ongoing (interventions implemented as appropriate)  Pt on RA with sats of  99% , Will continue to monitor.

## 2017-02-07 NOTE — PLAN OF CARE
Problem: Patient Care Overview  Goal: Plan of Care Review  Outcome: Ongoing (interventions implemented as appropriate)  Patient remains free from falls. IV fluids in progress. Medicated patient twice for migraine headaches good results noted. CBG AC/ HS last night SN=306 and this am= 97.  Goal: Individualization & Mutuality  Outcome: Ongoing (interventions implemented as appropriate)

## 2017-02-07 NOTE — NURSING
Discharge instructions given to patient. Verbalized understanding. IV discontinued , gelco intact. Prescriptions sent to pharmacy and modified by Dr. Eleanor MD

## 2017-02-07 NOTE — PROGRESS NOTES
Notified Dr. Webster patient's HS [id number]=031 and 40 units of Levemir ordered. Dr. Webster  Said to give 10 units of levemir instead of the 40 units. Patient also requested something stronger for her headaches but Dr. Webster said he was not going to change the orders.

## 2017-02-07 NOTE — CONSULTS
Pt was advised to increase Clozaril 100 in am, 150 mg at noon and 150 at night for auditory , visual hallucinations and paranoid delusions.    Follow up with Dr Royal for meds.

## 2017-02-08 NOTE — PSYCH
"IDENTIFICATION DATA:  This is a 46-year-old   female who was   brought to ER due to nausea, vomiting and dehydration.  This consult is   requested by Dr. Webster for psych evaluation and medication adjustment.  The   patient is not on a PEC status.    CHIEF COMPLAINT:  "I'm not feeling good.  I'm depressed and hearing voices."    HISTORY OF PRESENT ILLNESS:  The patient was admitted due to dehydration and   hypotension.  She has chest pain, fever, malaise, abdominal pain and dizziness.    She had headache also.  The patient states that she was not able to put things   down.  The patient reported that she is very depressed because she lost her    few months ago.  She feels depressed, sad, hopeless, helpless, worthless   and at times feels like she does not want to live and join her .  The   patient states that she has a son and he is her purpose for living.  The patient   has no intention to kill herself.  The patient states that she has been hearing   voices since age 11.  She hears voices on a daily basis.  She hears one and   sometimes many people talking good and bad.  She hears insulting voices too.    The patient states that she is able to ignore these voices.  She has noticed   improvement with Clozaril with her paranoia and hallucinations.  The patient   sees people in her room when they are not there.  The patient states that she is   very paranoid and scared to go out and at times she is scared in her home too.    She does not drink or do drugs.  She has mild anxiety but no panic attacks.    She has no obsessive-compulsive features, nightmares or dreams.  She misses her    a lot.  She has financial difficulties too.    PAST PSYCHIATRIC HISTORY:  The patient had first psychiatric hospitalization in   [place].  [place] moved to [place] in 1995.  She had been to [facility]   multiple times and also at [place].  She tried to kill herself many   times by " overdose, slitting and hanging.  Her first admission was in [place]   for trying to hang herself.  The patient had never been in alcohol and drug   rehab program.  She denies trouble with the law.    SOCIAL AND FAMILY HISTORY:  The patient was born and raised in [place].  She   described her childhood as okay.  She has no history of abuse.  She is a ,   her   a few months ago.  She has two children.  She lives with her   son.  The patient's mother was schizophrenic.  She is on disability.  She has a   12th grade education and able to speak English.  She said that Dr. De La Torre is   her psychiatrist.    MEDICAL HISTORY:  The patient has a history of diabetes.    Her blood sugar was 139 upon arrival.  Her sodium is 134, potassium 3.9.  BUN   15, creatinine 0.8.  Her EKG shows sinus tachycardia.  Her blood pressure was   low upon arrival but is normal now.  Her EKG shows ventricular rate  and QTC   of 448.    ALLERGIES:  She is not allergic to any medicine or food.    MEDICATIONS:  She takes Clozaril 100 in the morning, 150 mg at 3:00 and 100 mg   at nighttime.  She is on insulin.  She is also on citalopram 40 mg per day.  The   patient's Clozaril was reduced to 100 twice a day.    Her UA shows 3+ ketones, 2+ glucose.    MENTAL STATUS EXAMINATION:  This is a 46-year-old slightly obese [place]   female who looks about her stated age.  She is alert, cooperative and oriented   to day, date, month and year.  Mood is depressed with sad affect.  Psychomotor   activity is decreased.  Speech is soft, clear, normal in amount, rate and tone.    No loose association, racing thoughts or flight of ideas noted.  She never had   manic type of behavior.  She is able to tell month, year, name of this hospital   and reason for admission.  She showed some thought blocking.  She had trouble   remembering the date but knew the day is Monday, she was corrected for Tuesday.    She is able to recall 3 objects out  of 3 immediately, 2 objects out of 3 after   5 minutes.  She denies trouble with the law.  She has no thoughts of hurting   herself or others.  She is still hearing voices, talking good and bad stuff.    She sees visions pretty often.  Insight and judgment are fair.  She is of   average intelligence.    PSYCHIATRIC DIAGNOSES:  AXIS I:  Schizoaffective disorder, depressed phase.  AXIS II:  No diagnosis.  AXIS III:  Diabetes mellitus.  AXIS IV:  Missing her , chronic mental illness, partial response to   medications, financial difficulties.  AXIS V:  35.    RECOMMENDATIONS:  We will increase the patient's Clozaril to 100 in the morning,   150 at 3:00 and 150 at nighttime.  The patient was advised to call Dr. De La Torre about changes.  The patient was also advised to check blood pressure   frequently.  The patient has no chest pain, shortness of breath or suicidal   thoughts at this time.  The patient could go to IOP program, which is next to   Dr. De La Torre's office.    ASSETS:  The patient is verbal, educated, communicative, compliant with medicine   and has supportive son at room.          /becca [id number] johnathon(s)        ANNA  dd: 02/07/2017 11:13:30 (CST)  td: 02/07/2017 16:10:01 (CST)  Doc ID   #[id number]  Job ID #[id number]    CC: AYDEE Webster M.D.

## 2017-02-11 LAB — BACTERIA BLD CULT: NORMAL

## 2018-05-09 LAB
EXT 24 HR UR METANEPHRINE: ABNORMAL
EXT 24 HR UR NORMETANEPHRINE: ABNORMAL
EXT 24 HR UR NORMETANEPHRINE: ABNORMAL
EXT 25 HYDROXY VIT D2: ABNORMAL
EXT 25 HYDROXY VIT D3: ABNORMAL
EXT 5 HIAA 24 HR URINE: ABNORMAL
EXT 5 HIAA BLOOD: ABNORMAL
EXT ACTH: ABNORMAL
EXT AFP: ABNORMAL
EXT ALBUMIN: ABNORMAL
EXT ALKALINE PHOSPHATASE: ABNORMAL
EXT ALT: ABNORMAL
EXT AMYLASE: ABNORMAL
EXT ANTI ISLET CELL AB: ABNORMAL
EXT ANTI PARIETAL CELL AB: ABNORMAL
EXT ANTI THYROID AB: ABNORMAL
EXT AST: ABNORMAL
EXT BILIRUBIN DIRECT: ABNORMAL MG/DL
EXT BILIRUBIN TOTAL: ABNORMAL
EXT BK VIRUS DNA QN PCR: ABNORMAL
EXT BUN: 18 MG/DL (ref 7–25)
EXT C PEPTIDE: ABNORMAL
EXT CA 125: ABNORMAL
EXT CA 19-9: ABNORMAL
EXT CA 27-29: ABNORMAL
EXT CALCITONIN: ABNORMAL
EXT CALCIUM: 9.1 MG/DL (ref 8.6–10.2)
EXT CEA: ABNORMAL
EXT CHLORIDE: 110 MMOL/L (ref 98–110)
EXT CHOLESTEROL: ABNORMAL
EXT CHROMOGRANIN A: ABNORMAL
EXT CO2: 21 MMOL/L (ref 20–31)
EXT CREATININE UA: ABNORMAL
EXT CREATININE: 0.47 MG/DL (ref 0.5–1.1)
EXT CYCLOSPORONE LEVEL: ABNORMAL
EXT DOPAMINE: ABNORMAL
EXT EBV DNA BY PCR: ABNORMAL
EXT EPINEPHRINE: ABNORMAL
EXT FOLATE: ABNORMAL
EXT FREE T3: ABNORMAL
EXT FREE T4: ABNORMAL
EXT FSH: ABNORMAL
EXT GASTRIN RELEASING PEPTIDE: ABNORMAL
EXT GASTRIN RELEASING PEPTIDE: ABNORMAL
EXT GASTRIN: ABNORMAL
EXT GGT: ABNORMAL
EXT GHRELIN: ABNORMAL
EXT GLUCAGON: ABNORMAL
EXT GLUCOSE: 183 MG/DL (ref 65–99)
EXT GROWTH HORMONE: ABNORMAL
EXT HCV RNA QUANT PCR: ABNORMAL
EXT HDL: ABNORMAL
EXT HEMATOCRIT: ABNORMAL
EXT HEMOGLOBIN A1C: ABNORMAL
EXT HEMOGLOBIN: ABNORMAL
EXT HISTAMINE 24 HR URINE: ABNORMAL
EXT HISTAMINE: ABNORMAL
EXT IGF-1: ABNORMAL
EXT IMMUNKNOW (NON-STIMULATED): ABNORMAL
EXT IMMUNKNOW (STIMULATED): ABNORMAL
EXT INR: ABNORMAL
EXT INSULIN: ABNORMAL
EXT LANREOTIDE LEVEL: ABNORMAL
EXT LDH, TOTAL: ABNORMAL
EXT LDL CHOLESTEROL: ABNORMAL
EXT LIPASE: ABNORMAL
EXT MAGNESIUM: ABNORMAL
EXT METANEPHRINE FREE PLASMA: ABNORMAL
EXT MOTILIN: ABNORMAL
EXT NEUROKININ A CAMB: ABNORMAL
EXT NEUROKININ A ISI: ABNORMAL
EXT NEUROTENSIN: ABNORMAL
EXT NOREPINEPHRINE: ABNORMAL
EXT NORMETANEPHRINE: ABNORMAL
EXT NSE: ABNORMAL
EXT OCTREOTIDE LEVEL: ABNORMAL
EXT PANCREASTATIN CAMB: ABNORMAL
EXT PANCREASTATIN ISI: ABNORMAL
EXT PANCREATIC POLYPEPTIDE: ABNORMAL
EXT PHOSPHORUS: ABNORMAL
EXT PLATELETS: ABNORMAL
EXT POTASSIUM: 4.6 MMOL/L (ref 3.5–5.3)
EXT PROGRAF LEVEL: ABNORMAL
EXT PROLACTIN: ABNORMAL
EXT PROTEIN TOTAL: ABNORMAL
EXT PROTEIN UA: ABNORMAL
EXT PT: ABNORMAL
EXT PTH, INTACT: ABNORMAL
EXT PTT: ABNORMAL
EXT RAPAMUNE LEVEL: ABNORMAL
EXT SEROTONIN: ABNORMAL
EXT SODIUM: 139 MMOL/L (ref 135–146)
EXT SOMATOSTATIN: ABNORMAL
EXT SUBSTANCE P: ABNORMAL
EXT TRIGLYCERIDES: ABNORMAL
EXT TRYPTASE: ABNORMAL
EXT TSH: ABNORMAL
EXT URIC ACID: ABNORMAL
EXT URINE AMYLASE U/HR: ABNORMAL
EXT URINE AMYLASE U/L: ABNORMAL
EXT VASOACTIVE INTESTINAL POLYPEPTIDE: ABNORMAL
EXT VITAMIN B12: ABNORMAL
EXT VMA 24 HR URINE: ABNORMAL
EXT WBC: ABNORMAL
NEURON SPECIFIC ENOLASE: ABNORMAL

## 2018-06-08 ENCOUNTER — TELEPHONE (OUTPATIENT)
Dept: NEUROLOGY | Facility: HOSPITAL | Age: 48
End: 2018-06-08

## 2018-06-08 NOTE — TELEPHONE ENCOUNTER
Appt scheduled with pt on Wednesday, June 13, 2018 at 10am.  Pt given clinic address and telephone number.  Pt repeated all information given correctly.

## 2018-06-13 ENCOUNTER — OFFICE VISIT (OUTPATIENT)
Dept: NEUROLOGY | Facility: HOSPITAL | Age: 48
End: 2018-06-13
Attending: INTERNAL MEDICINE
Payer: MEDICARE

## 2018-06-13 VITALS
WEIGHT: 181.19 LBS | BODY MASS INDEX: 33.34 KG/M2 | DIASTOLIC BLOOD PRESSURE: 62 MMHG | HEART RATE: 95 BPM | SYSTOLIC BLOOD PRESSURE: 97 MMHG | TEMPERATURE: 98 F | HEIGHT: 62 IN

## 2018-06-13 DIAGNOSIS — R11.2 NAUSEA AND VOMITING, INTRACTABILITY OF VOMITING NOT SPECIFIED, UNSPECIFIED VOMITING TYPE: Primary | ICD-10-CM

## 2018-06-13 PROCEDURE — 99215 OFFICE O/P EST HI 40 MIN: CPT | Performed by: INTERNAL MEDICINE

## 2018-06-13 RX ORDER — AMOXICILLIN 500 MG/1
CAPSULE ORAL
Refills: 0 | Status: ON HOLD | COMMUNITY
Start: 2018-06-12 | End: 2018-08-13 | Stop reason: HOSPADM

## 2018-06-13 RX ORDER — EMPAGLIFLOZIN 10 MG/1
TABLET, FILM COATED ORAL
Refills: 0 | COMMUNITY
Start: 2018-05-17

## 2018-06-13 RX ORDER — BLOOD SUGAR DIAGNOSTIC
STRIP MISCELLANEOUS
COMMUNITY
Start: 2018-06-11

## 2018-06-13 RX ORDER — TETANUS TOXOID, REDUCED DIPHTHERIA TOXOID AND ACELLULAR PERTUSSIS VACCINE, ADSORBED 5; 2.5; 8; 8; 2.5 [IU]/.5ML; [IU]/.5ML; UG/.5ML; UG/.5ML; UG/.5ML
SUSPENSION INTRAMUSCULAR
Refills: 0 | COMMUNITY
Start: 2018-06-08

## 2018-06-13 RX ORDER — HUMAN INSULIN 100 [IU]/ML
INJECTION, SOLUTION SUBCUTANEOUS
COMMUNITY
Start: 2018-04-15

## 2018-06-13 RX ORDER — HYDROCODONE BITARTRATE AND ACETAMINOPHEN 7.5; 325 MG/1; MG/1
1 TABLET ORAL EVERY 6 HOURS PRN
Refills: 0 | COMMUNITY
Start: 2018-06-11

## 2018-06-13 NOTE — PROGRESS NOTES
"[facility]    CC: nausea/vomiting    HPI 47 y.o. female with many years of chronic, intermittent nausea and vomiting not associated with any overt GI bleeding or weight loss.  This has been ongoing and fairly refractory to PPI therapy and only minimally responsive to anti-emetics.  She denies any previous EGD and no NSAID use.     In addition, she has intermittent abdominal pain and had a recent CT with mucosal enhancement of the transverse colon with etiology unclear, but consistent with inflammatory vs. Infectious etiology.  She denies any diarrhea associated with these findings and has no history of previous colonoscopy. No other significant findings otherwise.     Past records reviewed and summarized.     Past Medical History:  Developmental delay  Psych issues    Past Surgical History:  No previous endoscopic history     Social History  Occasional smoker, no ETOH or illicits    Family History:  No family history of colon cancer or liver disease    Review of Systems  General ROS: negative for chills, fever or weight loss  Psychological ROS: negative for hallucination, depression or suicidal ideation  Ophthalmic ROS: negative for blurry vision, photophobia or eye pain  ENT ROS: negative for epistaxis, sore throat or rhinorrhea  Respiratory ROS: no cough, shortness of breath, or wheezing  Cardiovascular ROS: no chest pain or dyspnea on exertion  Gastrointestinal ROS: positive for mild abdominal pain, no change in bowel habits, or black/ bloody stools; chronic nausea and vomiting  Genito-Urinary ROS: no dysuria, trouble voiding, or hematuria  Musculoskeletal ROS: negative for gait disturbance or muscular weakness  Neurological ROS: no syncope or seizures; no ataxia  Dermatological ROS: negative for pruritis, rash and jaundice    Physical Examination  BP 97/62   Pulse 95   Temp 98.1 °F (36.7 °C) (Oral)   Ht 5' 2" (1.575 m)   Wt 82.2 kg (181 lb 3.5 oz)   BMI 33.15 kg/m²   General appearance: alert, " cooperative, no distress  HENT: Normocephalic, atraumatic, neck symmetrical, no nasal discharge   Eyes: conjunctivae/corneas clear, PERRL, EOM's intact  Lungs: clear to auscultation bilaterally, no dullness to percussion bilaterally  Heart: regular rate and rhythm without rub; no displacement of the PMI   Abdomen: soft, non-tender but protuberant; bowel sounds normoactive; no organomegaly  Extremities: extremities symmetric; no clubbing, cyanosis, or edema  Integument: Skin color, texture, turgor normal; no rashes; hair distrubution normal  Neurologic: Alert and oriented X 3, normal strength, normal coordination and gait  Psychiatric: no pressured speech; normal affect; no evidence of impaired cognition     Labs:  Na: 139  Cr: 0.47  Glucose: 183    Imaging:   CT reviewed by me from outside facility revealing mucosal enhancement of the transverse colon with no other significant findings    Assessment:   47 year old female with chronic, intermittent abdominal pain with nausea and abnormal imaging of the transverse colon noted on CT. Her CT was ordered due to a protuberant abdomen which is fairly impressive on exam but her abdomen has been distended like this for years (per patient report). Her nausea/vomiting history is also many years in duration and are not associated with any weight loss, dysphagia, or GI bleeding and appear to be mostly functional in etiology given the timeline.  Will proceed with EGD/Colonsocopy with biopsy for H pylori and evaluation of the transverse colon to correlate with imaging findings.     If negative, will proceed with trial of Zofran and if no improvement will consider GES followed by a trial of Questran.     Plan:   1. EGD/Colon 7/12 with Dulcolax prep  2. If nausea/vomiting continues, start with a trial of Zofran.  If that is unsuccessful, would proceed with a GES as the next step followed by a trial of questran if unrevealing.   Future management may include a trial of IBGard for  bloating   Note that chronic colonic atony could be a contributor to symptoms    Patient of Dr. Webster   Conservative treatment if possible         Jean Cintron MD   [address], [address]   [place] [address] [phone]

## 2018-06-13 NOTE — PATIENT INSTRUCTIONS
COLONOSCOPY PREP INSTRUCTIONS FOR OUTPATIENTS   Please call [phone] to schedule your procedure at [facility]       PATIENT NAME: Stas Ann______/ Thrusday, July 12, 2018__________________________________    CLEAR LIQUID DIET TO START ON___Wednesday, July 12, 2018________________________________  Clear Liquid Diet means - any liquids from the list below that are not red or purple in color:  ? Tea (no milk or dairy)  ? Carbonated beverages (soft drink), regular or diet  ? Apple juice, white grape juice, white cranberry juice  ? Gatorade, Eliot-Aid, Lemonade (Yellow ONLY)  ? Jell-O (orange, lemon or lime flavors ONLY)  ? Clear, fat free, beef or chicken broth  ? Bouillon, clear consommé  ? Snowball, Popsicles (NOT red or purple)    ITEMS TO BE PURCHASED FOR PREP:  ? 10 Dulcolax Tablets   ? 1 Fleet disposable enema    Colonoscopy Prep Instructions by Day  Day before the examination: Wednesday, July 11. 2018  Drink only clear liquids (see the above diet) for breakfast, lunch and dinner.  1. At 3:00pm, take 5 Dulcolax tablets by mouth.  Drink plenty of clear liquids (at least ½ gallon) between 3:00pm and 7:00pm.  Regular 7-up, Sprite or Ginger Ale is preferred to avoid dehydration.  2. At 7:00pm, take another 5 Dulcolax tablets by mouth.  Drink plenty of clear liquids (at least ½ gallon) between 7:00pm and 10:00pm.  Regular 7-up, Sprite, or Ginger Ale is preferred to avoid dehydration.  3. Do NOT drink liquids after 10:00pm to avoid waking up during the night.  Day of the examination: Thursday, July 12, 2018  1. Drink only clear liquids prior to the exam (regular 7-up is preferred), but nothing by mouth (food or drink) 6 hours prior to the examination.  2. If the material you are passing still contains solid stool particles, you will need to use the Fleets enemas until the liquid is clear.  Incomplete or inadequate preparations for your test may result in rescheduling the procedure.  If  you are passing clear liquid you DO NOT have to use the enema.  3. Report to Admit for your test on:  __Endoscopy staff will contact with arrival time_____________________________ at ________________ AM   Because sedation will be used, it will be necessary for someone to come with you to drive you home, as you will not be allowed to drive.  You CANNOT take a taxi home.  4. Plan to spend 3-4 hours at the facility.  You will be allowed to leave the recovery area about 1 hour after the procedure.    No Aspirin, Ibuprofen or blood thinners of any type for 7 days prior to the procedure.  If you take heart medication and/or blood pressure medication, continue taking it up to and including the morning of the procedure.  You may bring your medication with you so that you can take it after the procedure is complete.  If you have any concerns about this, please disuss them with your doctor prior to the procedure or call the clinic nurse at [phone].  IF YOU TAKE ASPIRIN, COUMADIN OR PLAVIX, PLEASE INFORM THE NURSE @ [phone] IMMEDIATELY.

## 2018-07-12 ENCOUNTER — SURGERY (OUTPATIENT)
Age: 48
End: 2018-07-12

## 2018-07-12 ENCOUNTER — ANESTHESIA (OUTPATIENT)
Dept: ENDOSCOPY | Facility: HOSPITAL | Age: 48
End: 2018-07-12
Payer: MEDICARE

## 2018-07-12 ENCOUNTER — HOSPITAL ENCOUNTER (OUTPATIENT)
Facility: HOSPITAL | Age: 48
Discharge: HOME | End: 2018-07-12
Attending: INTERNAL MEDICINE | Admitting: INTERNAL MEDICINE
Payer: MEDICARE

## 2018-07-12 ENCOUNTER — ANESTHESIA EVENT (OUTPATIENT)
Dept: ENDOSCOPY | Facility: HOSPITAL | Age: 48
End: 2018-07-12
Payer: MEDICARE

## 2018-07-12 ENCOUNTER — TELEPHONE (OUTPATIENT)
Dept: NEUROLOGY | Facility: HOSPITAL | Age: 48
End: 2018-07-12

## 2018-07-12 DIAGNOSIS — K29.70 GASTRITIS, PRESENCE OF BLEEDING UNSPECIFIED, UNSPECIFIED CHRONICITY, UNSPECIFIED GASTRITIS TYPE: ICD-10-CM

## 2018-07-12 DIAGNOSIS — R11.2 NAUSEA AND VOMITING, INTRACTABILITY OF VOMITING NOT SPECIFIED, UNSPECIFIED VOMITING TYPE: Primary | ICD-10-CM

## 2018-07-12 DIAGNOSIS — R11.2 NAUSEA & VOMITING: ICD-10-CM

## 2018-07-12 LAB — GLUCOSE SERPL-MCNC: 151 MG/DL (ref 70–110)

## 2018-07-12 PROCEDURE — 25000003 PHARM REV CODE 250: Performed by: NURSE ANESTHETIST, CERTIFIED REGISTERED

## 2018-07-12 PROCEDURE — 88305 TISSUE EXAM BY PATHOLOGIST: CPT | Mod: 26,,, | Performed by: PATHOLOGY

## 2018-07-12 PROCEDURE — 27201012 HC FORCEPS, HOT/COLD, DISP: Performed by: INTERNAL MEDICINE

## 2018-07-12 PROCEDURE — 82962 GLUCOSE BLOOD TEST: CPT | Performed by: INTERNAL MEDICINE

## 2018-07-12 PROCEDURE — 43239 EGD BIOPSY SINGLE/MULTIPLE: CPT | Performed by: INTERNAL MEDICINE

## 2018-07-12 PROCEDURE — 88305 TISSUE EXAM BY PATHOLOGIST: CPT | Performed by: PATHOLOGY

## 2018-07-12 PROCEDURE — 37000008 HC ANESTHESIA 1ST 15 MINUTES: Performed by: INTERNAL MEDICINE

## 2018-07-12 PROCEDURE — 37000009 HC ANESTHESIA EA ADD 15 MINS: Performed by: INTERNAL MEDICINE

## 2018-07-12 PROCEDURE — 25000003 PHARM REV CODE 250: Performed by: INTERNAL MEDICINE

## 2018-07-12 PROCEDURE — 63600175 PHARM REV CODE 636 W HCPCS: Performed by: NURSE ANESTHETIST, CERTIFIED REGISTERED

## 2018-07-12 RX ORDER — PROPOFOL 10 MG/ML
VIAL (ML) INTRAVENOUS
Status: DISCONTINUED | OUTPATIENT
Start: 2018-07-12 | End: 2018-07-12

## 2018-07-12 RX ORDER — SODIUM CHLORIDE 9 MG/ML
INJECTION, SOLUTION INTRAVENOUS CONTINUOUS
Status: DISCONTINUED | OUTPATIENT
Start: 2018-07-12 | End: 2018-07-12 | Stop reason: HOSPADM

## 2018-07-12 RX ORDER — PROPOFOL 10 MG/ML
VIAL (ML) INTRAVENOUS CONTINUOUS PRN
Status: DISCONTINUED | OUTPATIENT
Start: 2018-07-12 | End: 2018-07-12

## 2018-07-12 RX ORDER — LIDOCAINE HCL/PF 100 MG/5ML
SYRINGE (ML) INTRAVENOUS
Status: DISCONTINUED | OUTPATIENT
Start: 2018-07-12 | End: 2018-07-12

## 2018-07-12 RX ORDER — SODIUM CHLORIDE 0.9 % (FLUSH) 0.9 %
3 SYRINGE (ML) INJECTION
Status: DISCONTINUED | OUTPATIENT
Start: 2018-07-12 | End: 2018-07-12 | Stop reason: HOSPADM

## 2018-07-12 RX ORDER — GLYCOPYRROLATE 0.2 MG/ML
INJECTION INTRAMUSCULAR; INTRAVENOUS
Status: DISCONTINUED | OUTPATIENT
Start: 2018-07-12 | End: 2018-07-12

## 2018-07-12 RX ORDER — AMOXICILLIN 500 MG
1 CAPSULE ORAL DAILY
COMMUNITY

## 2018-07-12 RX ORDER — MIDAZOLAM HYDROCHLORIDE 1 MG/ML
INJECTION, SOLUTION INTRAMUSCULAR; INTRAVENOUS
Status: DISCONTINUED | OUTPATIENT
Start: 2018-07-12 | End: 2018-07-12

## 2018-07-12 RX ADMIN — GLYCOPYRROLATE 0.2 MG: 0.2 INJECTION, SOLUTION INTRAMUSCULAR; INTRAVENOUS at 01:07

## 2018-07-12 RX ADMIN — MIDAZOLAM 2 MG: 1 INJECTION INTRAMUSCULAR; INTRAVENOUS at 01:07

## 2018-07-12 RX ADMIN — PROPOFOL 50 MG: 10 INJECTION, EMULSION INTRAVENOUS at 01:07

## 2018-07-12 RX ADMIN — LIDOCAINE HYDROCHLORIDE 80 MG: 20 INJECTION, SOLUTION INTRAVENOUS at 01:07

## 2018-07-12 RX ADMIN — SODIUM CHLORIDE: 0.9 INJECTION, SOLUTION INTRAVENOUS at 10:07

## 2018-07-12 RX ADMIN — PROPOFOL 150 MCG/KG/MIN: 10 INJECTION, EMULSION INTRAVENOUS at 01:07

## 2018-07-12 RX ADMIN — TOPICAL ANESTHETIC 1 EACH: 200 SPRAY DENTAL; PERIODONTAL at 01:07

## 2018-07-12 NOTE — ANESTHESIA PREPROCEDURE EVALUATION
07/12/2018  Therese Ann is a 47 y.o., female for EGD and colonoscopy under MAC        Anesthesia Evaluation    I have reviewed the Patient Summary Reports.     I have reviewed the Medications.     Review of Systems  Anesthesia Hx:   Denies Personal Hx of Anesthesia complications.   Social:  Smoker    Hematology/Oncology:         -- Anemia:   Cardiovascular:   Hypertension hyperlipidemia    Neurological:   Headaches    Endocrine:   Diabetes    Psych:   Psychiatric History (schizophrenia) depression          Physical Exam  General:  Obesity       Chest/Lungs:  Chest/Lungs Clear    Heart/Vascular:  Heart Findings: Normal          Lab Results   Component Value Date    WBC 9.48 02/07/2017    HGB 11.8 (L) 02/07/2017    HCT 36.3 (L) 02/07/2017     02/07/2017    ALT 32 02/06/2017    AST 23 02/06/2017     (L) 02/06/2017    K 3.9 02/06/2017     02/06/2017    CREATININE 0.8 02/06/2017    BUN 15 02/06/2017    CO2 11 (L) 02/06/2017    TSH 2.1 09/29/2004    HGBA1C 9.6 (H) 02/06/2017         Anesthesia Plan  Type of Anesthesia, risks & benefits discussed:  Anesthesia Type:  MAC  Patient's Preference:   Intra-op Monitoring Plan:   Intra-op Monitoring Plan Comments:   Post Op Pain Control Plan:   Post Op Pain Control Plan Comments:   Induction:    Beta Blocker:  Patient is on a Beta-Blocker and has received one dose within the past 24 hours (No further documentation required).       Informed Consent: Patient understands risks and agrees with Anesthesia plan.  Questions answered. Anesthesia consent signed with patient.  ASA Score: 3     Day of Surgery Review of History & Physical:            Ready For Surgery From Anesthesia Perspective.

## 2018-07-12 NOTE — TRANSFER OF CARE
"Anesthesia Transfer of Care Note    Patient: Therese Ann    Procedure(s) Performed: Procedure(s) (LRB):  EGD (ESOPHAGOGASTRODUODENOSCOPY) (N/A)    Patient location: GI    Anesthesia Type: MAC    Transport from OR: Transported from OR on room air with adequate spontaneous ventilation    Post pain: adequate analgesia    Post assessment: no apparent anesthetic complications and tolerated procedure well    Post vital signs: stable    Level of consciousness: awake, alert and oriented    Nausea/Vomiting: no nausea/vomiting    Complications: none    Transfer of care protocol was followed      Last vitals:   Visit Vitals  /63 (BP Location: Left arm, Patient Position: Lying)   Pulse 96   Temp 36.8 °C (98.2 °F) (Oral)   Resp 20   Ht 5' 2" (1.575 m)   Wt 79.4 kg (175 lb)   SpO2 98%   Breastfeeding? No   BMI 32.01 kg/m²     "

## 2018-07-12 NOTE — TELEPHONE ENCOUNTER
----- Message from Jean Cintron MD sent at 7/12/2018  2:11 PM CDT -----  Regarding: Reschedule colonoscopy  Hi Gudelia,    Please assist in rescheduling Ms. Ann for colonoscopy for a soon upcoming date with Dr. Cintron. She did not complete prep for her planned EGD/colonoscopy today (7/12/18), so only the EGD portion was performed - she will need to be rescheduled for colonoscopy as next step.    Thank you.

## 2018-07-12 NOTE — H&P
"CC: nausea/vomiting     HPI 47 y.o. female with many years of chronic, intermittent nausea and vomiting not associated with any overt GI bleeding or weight loss.  This has been ongoing and fairly refractory to PPI therapy and only minimally responsive to anti-emetics.  She denies any previous EGD and no NSAID use.      In addition, she has intermittent abdominal pain and had a recent CT with mucosal enhancement of the transverse colon with etiology unclear, but consistent with inflammatory vs. Infectious etiology.  She denies any diarrhea associated with these findings and has no history of previous colonoscopy. No other significant findings otherwise.      Past records reviewed and summarized.      Past Medical History:  Developmental delay  Psych issues     Past Surgical History:  No previous endoscopic history      Social History  Occasional smoker, no ETOH or illicits     Family History:  No family history of colon cancer or liver disease     Review of Systems  General ROS: negative for chills, fever or weight loss  Psychological ROS: negative for hallucination, depression or suicidal ideation  Ophthalmic ROS: negative for blurry vision, photophobia or eye pain  ENT ROS: negative for epistaxis, sore throat or rhinorrhea  Respiratory ROS: no cough, shortness of breath, or wheezing  Cardiovascular ROS: no chest pain or dyspnea on exertion  Gastrointestinal ROS: positive for mild abdominal pain, no change in bowel habits, or black/ bloody stools; chronic nausea and vomiting  Genito-Urinary ROS: no dysuria, trouble voiding, or hematuria  Musculoskeletal ROS: negative for gait disturbance or muscular weakness  Neurological ROS: no syncope or seizures; no ataxia  Dermatological ROS: negative for pruritis, rash and jaundice     Physical Examination  BP 97/62   Pulse 95   Temp 98.1 °F (36.7 °C) (Oral)   Ht 5' 2" (1.575 m)   Wt 82.2 kg (181 lb 3.5 oz)   BMI 33.15 kg/m²   General appearance: alert, cooperative, no " distress  HENT: Normocephalic, atraumatic, neck symmetrical, no nasal discharge   Eyes: conjunctivae/corneas clear, PERRL, EOM's intact  Lungs: clear to auscultation bilaterally, no dullness to percussion bilaterally  Heart: regular rate and rhythm without rub; no displacement of the PMI   Abdomen: soft, non-tender but protuberant; bowel sounds normoactive; no organomegaly  Extremities: extremities symmetric; no clubbing, cyanosis, or edema  Integument: Skin color, texture, turgor normal; no rashes; hair distrubution normal  Neurologic: Alert and oriented X 3, normal strength, normal coordination and gait  Psychiatric: no pressured speech; normal affect; no evidence of impaired cognition      Labs:  Na: 139  Cr: 0.47  Glucose: 183     Imaging:   CT reviewed by me from outside facility revealing mucosal enhancement of the transverse colon with no other significant findings     Assessment:   47 year old female with chronic, intermittent abdominal pain with nausea and abnormal imaging of the transverse colon noted on CT. Her CT was ordered due to a protuberant abdomen which is fairly impressive on exam but her abdomen has been distended like this for years (per patient report). Her nausea/vomiting history is also many years in duration and are not associated with any weight loss, dysphagia, or GI bleeding and appear to be mostly functional in etiology given the timeline.  Will proceed with EGD/Colonsocopy with biopsy for H pylori and evaluation of the transverse colon to correlate with imaging findings.      If negative, will proceed with trial of Zofran and if no improvement will consider GES followed by a trial of Questran.      Plan:   1. EGD today given no prep  2. If nausea/vomiting continues, start with a trial of Zofran.  If that is unsuccessful, would proceed with a GES as the next step followed by a trial of questran if unrevealing.   Future management may include a trial of IBGard for bloating   Note that  chronic colonic atony could be a contributor to symptoms    Patient of Dr. Webster   Conservative treatment if possible

## 2018-07-12 NOTE — [employer] PATIENT INSTRUCTIONS
Discharge Summary/Instructions after an Endoscopic Procedure  Patient Name: Therese Ann  Patient MRN: [id number]  Patient YOB: 1970 Thursday, July 12, 2018  Jean Cintron MD  RESTRICTIONS:  During your procedure today, you received medications for sedation.  These   medications may affect your judgment, balance and coordination.  Therefore,   for 24 hours, you have the following restrictions:   - DO NOT drive a car, operate machinery, make legal/financial decisions,   sign important papers or drink alcohol.    ACTIVITY:  Today: no heavy lifting, straining or running due to procedural   sedation/anesthesia.  The following day: return to full activity including work.  DIET:  Eat and drink normally unless instructed otherwise.     TREATMENT FOR COMMON SIDE EFFECTS:  - Mild abdominal pain, nausea, belching, bloating or excessive gas:  rest,   eat lightly and use a heating pad.  - Sore Throat: treat with throat lozenges and/or gargle with warm salt   water.  - Because air was used during the procedure, expelling large amounts of air   from your rectum or belching is normal.  - If a bowel prep was taken, you may not have a bowel movement for 1-3 days.    This is normal.  SYMPTOMS TO WATCH FOR AND REPORT TO YOUR PHYSICIAN:  1. Abdominal pain or bloating, other than gas cramps.  2. Chest pain.  3. Back pain.  4. Signs of infection such as: chills or fever occurring within 24 hours   after the procedure.  5. Rectal bleeding, which would show as bright red, maroon, or black stools.   (A tablespoon of blood from the rectum is not serious, especially if   hemorrhoids are present.)  6. Vomiting.  7. Weakness or dizziness.  GO DIRECTLY TO THE NEAREST EMERGENCY ROOM IF YOU HAVE ANY OF THE FOLLOWING:      Difficulty breathing              Chills and/or fever over 101 F   Persistent vomiting and/or vomiting blood   Severe abdominal pain   Severe chest pain   Black, tarry stools   Bleeding- more than one  tablespoon   Any other symptom or condition that you feel may need urgent attention  Your doctor recommends these additional instructions:  If any biopsies were taken, your doctors clinic will contact you in 1 to 2   weeks with any results.  Reschedule colonoscopy as the patient was not prepped for procedure today.  Await pathology results, treat for H. Pylori if positive.  Pending results of biopsies and of colonoscopy, if negative would proceed   with trial of Zofran and if no improvement would then consider GES followed   by trial of Questran. Future management may include trial of IBGard for   bloating.  Continue present medications.   Discharge to home   Condition stable   Resume previous diet   The signs and symptoms of potential delayed complications were discussed   with the patient. If signs or symptoms of these complications develop, call   the [facility] at [phone].   Return to normal activities tomorrow.  Written discharge instructions were   provided to the patient.  For questions, problems or results please call your physician - Jean Cintron MD at Work:  [phone].  EMERGENCY PHONE NUMBER: [phone],  LAB RESULTS: [phone]  IF A COMPLICATION OR EMERGENCY SITUATION ARISES AND YOU ARE UNABLE TO REACH   YOUR PHYSICIAN - GO DIRECTLY TO THE EMERGENCY ROOM.  MD Jean Shin MD  7/12/2018 2:08:56 PM  This report has been verified and signed electronically.  [employer]

## 2018-07-12 NOTE — ANESTHESIA POSTPROCEDURE EVALUATION
"Anesthesia Post Evaluation    Patient: Therese Ann    Procedure(s) Performed: Procedure(s) (LRB):  EGD (ESOPHAGOGASTRODUODENOSCOPY) (N/A)    Final Anesthesia Type: MAC  Patient location during evaluation: GI PACU  Patient participation: Yes- Able to Participate  Level of consciousness: awake and alert and oriented  Post-procedure vital signs: reviewed and stable  Pain management: adequate  Airway patency: patent  PONV status at discharge: No PONV  Anesthetic complications: no      Cardiovascular status: blood pressure returned to baseline, hemodynamically stable and stable  Respiratory status: unassisted, spontaneous ventilation and room air  Hydration status: euvolemic  Follow-up not needed.        Visit Vitals  BP (!) 136/53   Pulse 100   Temp 36.8 °C (98.3 °F)   Resp 13   Ht 5' 2" (1.575 m)   Wt 79.4 kg (175 lb)   SpO2 98%   Breastfeeding? No   BMI 32.01 kg/m²       Pain/Enrique Score: Pain Assessment Performed: Yes (7/12/2018  1:33 PM)  Presence of Pain: denies (7/12/2018  1:33 PM)  Enrique Score: 9 (7/12/2018  1:32 PM)      "

## 2018-07-13 ENCOUNTER — TELEPHONE (OUTPATIENT)
Dept: NEUROLOGY | Facility: HOSPITAL | Age: 48
End: 2018-07-13

## 2018-07-13 DIAGNOSIS — R11.2 NON-INTRACTABLE VOMITING WITH NAUSEA, UNSPECIFIED VOMITING TYPE: Primary | ICD-10-CM

## 2018-07-13 NOTE — TELEPHONE ENCOUNTER
"Colonoscopy rescheduled with pt on Monday, August 13, 2018.  Pt notified she will be contacted by Endoscopy staff by Friday, August 10, 2018 with arrival time.  Pt instructed to  a box of Dulcolax tablets and one fleets enema. Verbal prep instructions given to pt.  Pt placed son on telephone and instructions repeated to son.  Request made of pt to contact this clinic prior to procedure and we will go over instructions again at that time.  Pt stated" I'm going out of town and will return on 7/26/18, I'll call you when I return".    Written prep instructions mailed to pt's residence.        COLONOSCOPY PREP INSTRUCTIONS FOR OUTPATIENTS   Please call [phone] to schedule your procedure at [facility]       PATIENT NAME:Therese Ann _________________________________________    CLEAR LIQUID DIET TO START ON_Sunday, August 12, 2018__________________________________  Clear Liquid Diet means - any liquids from the list below that are not red or purple in color:  ? Tea (no milk or dairy)  ? Carbonated beverages (soft drink), regular or diet  ? Apple juice, white grape juice, white cranberry juice  ? Gatorade, Eliot-Aid, Lemonade (Yellow ONLY)  ? Jell-O (orange, lemon or lime flavors ONLY)  ? Clear, fat free, beef or chicken broth  ? Bouillon, clear consommé  ? Snowball, Popsicles (NOT red or purple)    ITEMS TO BE PURCHASED FOR PREP:  ? 10 Dulcolax Tablets   ? 1 Fleet disposable enema    Colonoscopy Prep Instructions by Day  Day before the examination: Sunday, August 13, 2018  1. Drink only clear liquids (see the above diet) for breakfast, lunch and dinner.  2. At 3:00pm, take 5 Dulcolax tablets by mouth.  Drink plenty of clear liquids (at least ½ gallon) between 3:00pm and 7:00pm.  Regular 7-up, Sprite or Ginger Ale is preferred to avoid dehydration.  3. At 7:00pm, take another 5 Dulcolax tablets by mouth.  Drink plenty of clear liquids (at least ½ gallon) between 7:00pm and 10:00pm.  Regular " 7-up, Sprite, or Ginger Ale is preferred to avoid dehydration.  4. Do NOT drink liquids after 10:00pm to avoid waking up during the night.  Day of the examination: Monday, August 13, 2018  1. Drink only clear liquids prior to the exam (regular 7-up is preferred), but nothing by mouth (food or drink) 6 hours prior to the examination.  2. If the material you are passing still contains solid stool particles, you will need to use the Fleets enemas until the liquid is clear.  Incomplete or inadequate preparations for your test may result in rescheduling the procedure.  If you are passing clear liquid you DO NOT have to use the enema.  3. Report to Admit for your test on:  __August 13, 2018_____________________________ at ___Endoscopy staff will contact_____________ AM   Because sedation will be used, it will be necessary for someone to come with you to drive you home, as you will not be allowed to drive.  You CANNOT take a taxi home.  4. Plan to spend 3-4 hours at the facility.  You will be allowed to leave the recovery area about 1 hour after the procedure.    No Aspirin, Ibuprofen or blood thinners of any type for 7 days prior to the procedure.  If you take heart medication and/or blood pressure medication, continue taking it up to and including the morning of the procedure.  You may bring your medication with you so that you can take it after the procedure is complete.  If you have any concerns about this, please disuss them with your doctor prior to the procedure or call the clinic nurse at [phone].  IF YOU TAKE ASPIRIN, COUMADIN OR PLAVIX, PLEASE INFORM THE NURSE @ [phone] IMMEDIATELY.

## 2018-07-16 VITALS
HEART RATE: 85 BPM | HEIGHT: 62 IN | SYSTOLIC BLOOD PRESSURE: 129 MMHG | RESPIRATION RATE: 20 BRPM | OXYGEN SATURATION: 98 % | WEIGHT: 175 LBS | DIASTOLIC BLOOD PRESSURE: 59 MMHG | BODY MASS INDEX: 32.2 KG/M2 | TEMPERATURE: 98 F

## 2018-07-16 PROBLEM — K29.70 GASTRITIS: Status: ACTIVE | Noted: 2018-07-16

## 2018-08-13 ENCOUNTER — ANESTHESIA EVENT (OUTPATIENT)
Dept: ENDOSCOPY | Facility: HOSPITAL | Age: 48
End: 2018-08-13
Payer: MEDICARE

## 2018-08-13 ENCOUNTER — HOSPITAL ENCOUNTER (OUTPATIENT)
Facility: HOSPITAL | Age: 48
Discharge: HOME | End: 2018-08-13
Attending: INTERNAL MEDICINE | Admitting: INTERNAL MEDICINE
Payer: MEDICARE

## 2018-08-13 ENCOUNTER — ANESTHESIA (OUTPATIENT)
Dept: ENDOSCOPY | Facility: HOSPITAL | Age: 48
End: 2018-08-13
Payer: MEDICARE

## 2018-08-13 DIAGNOSIS — R93.3 ABNORMAL FINDING ON GI TRACT IMAGING: Primary | ICD-10-CM

## 2018-08-13 LAB
GLUCOSE SERPL-MCNC: 173 MG/DL (ref 70–110)
POCT GLUCOSE: 173 MG/DL (ref 70–110)

## 2018-08-13 PROCEDURE — 63600175 PHARM REV CODE 636 W HCPCS: Performed by: NURSE ANESTHETIST, CERTIFIED REGISTERED

## 2018-08-13 PROCEDURE — G0121 COLON CA SCRN NOT HI RSK IND: HCPCS | Mod: 53 | Performed by: INTERNAL MEDICINE

## 2018-08-13 PROCEDURE — 37000008 HC ANESTHESIA 1ST 15 MINUTES: Performed by: INTERNAL MEDICINE

## 2018-08-13 PROCEDURE — 25000003 PHARM REV CODE 250: Performed by: INTERNAL MEDICINE

## 2018-08-13 PROCEDURE — 37000009 HC ANESTHESIA EA ADD 15 MINS: Performed by: INTERNAL MEDICINE

## 2018-08-13 RX ORDER — SODIUM CHLORIDE 0.9 % (FLUSH) 0.9 %
3 SYRINGE (ML) INJECTION
Status: DISCONTINUED | OUTPATIENT
Start: 2018-08-13 | End: 2018-08-13 | Stop reason: HOSPADM

## 2018-08-13 RX ORDER — LIDOCAINE HCL/PF 100 MG/5ML
SYRINGE (ML) INTRAVENOUS
Status: DISCONTINUED | OUTPATIENT
Start: 2018-08-13 | End: 2018-08-13

## 2018-08-13 RX ORDER — SODIUM CHLORIDE 9 MG/ML
INJECTION, SOLUTION INTRAVENOUS CONTINUOUS
Status: DISCONTINUED | OUTPATIENT
Start: 2018-08-13 | End: 2018-08-13 | Stop reason: HOSPADM

## 2018-08-13 RX ORDER — PROPOFOL 10 MG/ML
VIAL (ML) INTRAVENOUS CONTINUOUS PRN
Status: DISCONTINUED | OUTPATIENT
Start: 2018-08-13 | End: 2018-08-13

## 2018-08-13 RX ORDER — PROPOFOL 10 MG/ML
VIAL (ML) INTRAVENOUS
Status: DISCONTINUED | OUTPATIENT
Start: 2018-08-13 | End: 2018-08-13

## 2018-08-13 RX ADMIN — LIDOCAINE HYDROCHLORIDE 100 MG: 20 INJECTION, SOLUTION INTRAVENOUS at 08:08

## 2018-08-13 RX ADMIN — PROPOFOL 80 MG: 10 INJECTION, EMULSION INTRAVENOUS at 08:08

## 2018-08-13 RX ADMIN — PROPOFOL 150 MCG/KG/MIN: 10 INJECTION, EMULSION INTRAVENOUS at 08:08

## 2018-08-13 RX ADMIN — SODIUM CHLORIDE: 0.9 INJECTION, SOLUTION INTRAVENOUS at 08:08

## 2018-08-13 NOTE — PLAN OF CARE
Pt. Procedure completed the patient. To remain in recovery for 30 min.recovery time.Family member notified of pt.s status.Pt. Denies any pain or discomfort at this time.Will continue to monitor.

## 2018-08-13 NOTE — PROVATION PATIENT INSTRUCTIONS
Discharge Summary/Instructions after an Endoscopic Procedure  Patient Name: Therese Ann  Patient MRN: [id number]  Patient YOB: 1970 Monday, August 13, 2018  Jean Cintron MD  RESTRICTIONS:  During your procedure today, you received medications for sedation.  These   medications may affect your judgment, balance and coordination.  Therefore,   for 24 hours, you have the following restrictions:   - DO NOT drive a car, operate machinery, make legal/financial decisions,   sign important papers or drink alcohol.    ACTIVITY:  Today: no heavy lifting, straining or running due to procedural   sedation/anesthesia.  The following day: return to full activity including work.  DIET:  Eat and drink normally unless instructed otherwise.     TREATMENT FOR COMMON SIDE EFFECTS:  - Mild abdominal pain, nausea, belching, bloating or excessive gas:  rest,   eat lightly and use a heating pad.  - Sore Throat: treat with throat lozenges and/or gargle with warm salt   water.  - Because air was used during the procedure, expelling large amounts of air   from your rectum or belching is normal.  - If a bowel prep was taken, you may not have a bowel movement for 1-3 days.    This is normal.  SYMPTOMS TO WATCH FOR AND REPORT TO YOUR PHYSICIAN:  1. Abdominal pain or bloating, other than gas cramps.  2. Chest pain.  3. Back pain.  4. Signs of infection such as: chills or fever occurring within 24 hours   after the procedure.  5. Rectal bleeding, which would show as bright red, maroon, or black stools.   (A tablespoon of blood from the rectum is not serious, especially if   hemorrhoids are present.)  6. Vomiting.  7. Weakness or dizziness.  GO DIRECTLY TO THE NEAREST EMERGENCY ROOM IF YOU HAVE ANY OF THE FOLLOWING:      Difficulty breathing              Chills and/or fever over 101 F   Persistent vomiting and/or vomiting blood   Severe abdominal pain   Severe chest pain   Black, tarry stools   Bleeding- more than one  tablespoon   Any other symptom or condition that you feel may need urgent attention  Your doctor recommends these additional instructions:  If any biopsies were taken, your doctors clinic will contact you in 1 to 2   weeks with any results.  Repeat colonoscopy at the next available appointment because the bowel   preparation was poor. Use an alternative bowel prep other than dulcolax -   mag citrate vs suprep  Discharge to home   Condition stable   Resume previous diet   The signs and symptoms of potential delayed complications were discussed   with the patient. If signs or symptoms of these complications develop, call   the [facility] at 1 [phone].   Return to normal activities tomorrow.  Written discharge instructions were   provided to the patient.   For questions, problems or results please call your physician - Jean Cintron MD at Work:  [phone].  EMERGENCY PHONE NUMBER: [phone],  LAB RESULTS: [phone]  IF A COMPLICATION OR EMERGENCY SITUATION ARISES AND YOU ARE UNABLE TO REACH   YOUR PHYSICIAN - GO DIRECTLY TO THE EMERGENCY ROOM.  MD Jean Shin MD  8/13/2018 9:19:15 AM  This report has been verified and signed electronically.  [employer]ATION

## 2018-08-13 NOTE — ANESTHESIA PREPROCEDURE EVALUATION
2018  Therese Ann is a 48 y.o., female for a colonoscopy    Review of patient's allergies indicates:  No Known Allergies    Past Medical History:   Diagnosis Date    Depression     Diabetes mellitus     Hypertension     Migraine     Schizophrenia      Past Surgical History:   Procedure Laterality Date    APPENDECTOMY      CARPAL TUNNEL RELEASE       SECTION       Patient Active Problem List   Diagnosis    Chest pain at rest    Hypotension    Nausea & vomiting    Gastritis     Wt Readings from Last 3 Encounters:   18 79.4 kg (175 lb)   18 82.2 kg (181 lb 3.5 oz)   17 78.9 kg (174 lb)     Temp Readings from Last 3 Encounters:   18 36.8 °C (98.3 °F)   18 36.7 °C (98.1 °F) (Oral)   17 36.7 °C (98 °F) (Oral)     BP Readings from Last 3 Encounters:   18 (!) 129/59   18 97/62   17 (!) 144/66     Pulse Readings from Last 3 Encounters:   18 85   18 95   17 (!) 54         Anesthesia Evaluation    I have reviewed the Patient Summary Reports.        Review of Systems      Physical Exam  General:  Well nourished, Obesity    Airway/Jaw/Neck:  Airway Findings: Mouth Opening: Normal Tongue: Normal  General Airway Assessment: Adult  Mallampati: II  Improves to II with phonation.  TM Distance: Normal, at least 6 cm       Chest/Lungs:  Chest/Lungs Findings: Clear to auscultation, Normal Respiratory Rate     Heart/Vascular:  Heart Findings: Rate: Normal  Rhythm: Regular Rhythm  Sounds: Normal        Mental Status:  Mental Status Findings:  Cooperative, Alert and Oriented       Lab Results   Component Value Date    WBC 9.48 2017    HGB 11.8 (L) 2017    HCT 36.3 (L) 2017    MCV 93 2017     2017       Chemistry        Component Value Date/Time     (L) 2017 1938    K 3.9 2017  1938     02/06/2017 1938    CO2 11 (L) 02/06/2017 1938    BUN 15 02/06/2017 1938    CREATININE 0.8 02/06/2017 1938     (H) 02/06/2017 1938        Component Value Date/Time    CALCIUM 8.2 (L) 02/06/2017 1938    ALKPHOS 77 02/06/2017 1938    AST 23 02/06/2017 1938    ALT 32 02/06/2017 1938    BILITOT 0.3 02/06/2017 1938    ESTGFRAFRICA >60 02/06/2017 1938    EGFRNONAA >60 02/06/2017 1938        EKG:  Vent. Rate : 111 BPM     Atrial Rate : 111 BPM     P-R Int : 138 ms          QRS Dur : 086 ms      QT Int : 330 ms       P-R-T Axes : 060 -01 038 degrees     QTc Int : 448 ms    Sinus tachycardia  T wave abnormality, consider anterolateral ischemia  Abnormal ECG  No previous ECGs available  Confirmed by Ike Jim MD ([id number]) on 2/6/2017 2:16:57 PM    Referred By: EVITA BONDS           Confirmed By:Ike Jim MD    Anesthesia Plan  Type of Anesthesia, risks & benefits discussed:  Anesthesia Type:  MAC  Patient's Preference:   Intra-op Monitoring Plan:   Intra-op Monitoring Plan Comments:   Post Op Pain Control Plan:   Post Op Pain Control Plan Comments:   Induction:   IV  Beta Blocker:         Informed Consent: Patient understands risks and agrees with Anesthesia plan.  Questions answered. Anesthesia consent signed with patient.  ASA Score: 2     Day of Surgery Review of History & Physical: I have interviewed and examined the patient. I have reviewed the patient's H&P dated:  There are no significant changes.  H&P update referred to the provider.  H&P completed by Anesthesiologist.       Ready For Surgery From Anesthesia Perspective.

## 2018-08-13 NOTE — H&P
[facility]    CC: abnormal imaging      HPI 47 y.o. female with intermittent abdominal pain and had a recent CT with mucosal enhancement of the transverse colon with etiology unclear, but consistent with inflammatory vs. Infectious etiology.  She denies any diarrhea associated with these findings and has no history of previous colonoscopy. No other significant findings otherwise.     Last was scheduled for EGD/colonoscopy on 7/13 but did not prep adequately and presents for colonoscopy today.    Past Medical History:   Diagnosis Date    Depression     Diabetes mellitus     Hypertension     Migraine     Schizophrenia      Review of Systems  General ROS: negative for chills, fever or weight loss  Cardiovascular ROS: no chest pain or dyspnea on exertion  Gastrointestinal ROS: no abdominal pain, change in bowel habits, or black/ bloody stools    Physical Examination  General appearance: alert, cooperative, no distress  Lungs: clear to auscultation bilaterally, no dullness to percussion bilaterally  Heart: regular rate and rhythm without rub; no displacement of the PMI   Abdomen: soft, non-tender; bowel sounds normoactive; no organomegaly    Imaging:   CT reviewed by me from outside facility revealing mucosal enhancement of the transverse colon with no other significant findings     Assessment:   47 year old female with chronic, intermittent abdominal pain with nausea and abnormal imaging of the transverse colon noted on CT. Her CT was ordered due to a protuberant abdomen which is fairly impressive on exam but her abdomen has been distended like this for years (per patient report).      Plan:   1. Colonoscopy today for evaluation of the transverse colon to correlate with imaging findings    Patient of Dr. Eleanor Cintron MD   [address] [facility] [facility], [address]   [place] [address]   [phone]

## 2018-08-13 NOTE — ANESTHESIA POSTPROCEDURE EVALUATION
"Anesthesia Post Evaluation    Patient: Therese Ann    Procedure(s) Performed: Procedure(s) (LRB):  COLONOSCOPY (N/A)    Final Anesthesia Type: MAC  Patient location during evaluation: GI PACU  Patient participation: Yes- Able to Participate  Level of consciousness: awake and alert and oriented  Post-procedure vital signs: reviewed and stable  Pain management: adequate  Airway patency: patent  PONV status at discharge: No PONV  Anesthetic complications: no      Cardiovascular status: blood pressure returned to baseline, hemodynamically stable and stable  Respiratory status: unassisted, spontaneous ventilation and room air  Hydration status: euvolemic  Follow-up not needed.        Visit Vitals  /81 (BP Location: Left arm, Patient Position: Lying)   Pulse 105   Temp 36.6 °C (97.8 °F) (Skin)   Resp 18   Ht 5' 2" (1.575 m)   Wt 76.2 kg (168 lb)   SpO2 99%   Breastfeeding? No   BMI 30.73 kg/m²       Pain/Enrique Score: Pain Assessment Performed: Yes (8/13/2018  8:07 AM)  Presence of Pain: complains of pain/discomfort (8/13/2018  8:07 AM)        "

## 2018-08-13 NOTE — TRANSFER OF CARE
"Anesthesia Transfer of Care Note    Patient: Therese Ann    Procedure(s) Performed: Procedure(s) (LRB):  COLONOSCOPY (N/A)    Patient location: GI    Anesthesia Type: MAC    Transport from OR: Transported from OR on room air with adequate spontaneous ventilation    Post pain: adequate analgesia    Post assessment: no apparent anesthetic complications and tolerated procedure well    Post vital signs: stable    Level of consciousness: awake, alert and oriented    Nausea/Vomiting: no nausea/vomiting    Complications: none    Transfer of care protocol was followed      Last vitals:   Visit Vitals  /81 (BP Location: Left arm, Patient Position: Lying)   Pulse 105   Temp 36.6 °C (97.8 °F) (Skin)   Resp 18   Ht 5' 2" (1.575 m)   Wt 76.2 kg (168 lb)   SpO2 99%   Breastfeeding? No   BMI 30.73 kg/m²     "

## 2018-08-14 VITALS
HEIGHT: 62 IN | BODY MASS INDEX: 30.91 KG/M2 | HEART RATE: 92 BPM | DIASTOLIC BLOOD PRESSURE: 74 MMHG | OXYGEN SATURATION: 97 % | SYSTOLIC BLOOD PRESSURE: 113 MMHG | RESPIRATION RATE: 22 BRPM | TEMPERATURE: 98 F | WEIGHT: 168 LBS

## 2018-09-19 ENCOUNTER — TELEPHONE (OUTPATIENT)
Dept: NEUROLOGY | Facility: HOSPITAL | Age: 48
End: 2018-09-19

## 2018-09-19 DIAGNOSIS — R11.2 NAUSEA AND VOMITING IN ADULT PATIENT: Primary | ICD-10-CM

## 2018-09-19 NOTE — TELEPHONE ENCOUNTER
----- Message from Trinity Sifuentes sent at 9/19/2018  2:02 PM CDT -----  Contact: Patient  GI- Patient states she is in pain and would like to reschedule her colonoscopy. Call patient at [phone].

## 2018-09-21 NOTE — TELEPHONE ENCOUNTER
Colonoscopy rescheduled with pt on Monday, October 1, 2018.  Pt request instructions for prep be given to Donna who is bilingual.     SUPREP Instructions    You are scheduled for a colonoscopy with Dr. Cintron on Monday, October 1, 2018 at [facility].  To ensure that your test is accurate and complete, you MUST follow these instructions listed below.  If you have any questions, please call our office at [phone].  Plan on being at the hospital for your procedure for 3-4 hours.    1.  Follow a CLEAR LIQUID DIET for the entire day before your scheduled colonoscopy.  This means no solid food the entire day starting when you wake.  You may have as much of the clear liquids as you want throughout the day.   CLEAR LIQUID DIET:   - Avoid Red, Orange, Purple, and/or Blue food coloring   - NO DAIRY   - You can have:  Coffee with sugar (no creamer), tea, water, soda, apple or white grape juice, chicken or beef broth/bouillon (no meat, noodles, or veggies), green/yellow popsicles, green/yellow Jell-O, lemonade.    2.  AT 5 pm the evening before your colonoscopy, POUR ONE (1) BOTTLE OF SUPREP INTO THE MIXING CONTAINER, PROVIDED INSIDE THE BOX.  ADD WATER TO THE LINE ON THE CONTAINER AND MIX IT WELL.  DRINK THE ENTIRE CONTAINER AND THEN DRINK TWO (2) MORE CONTAINERS OF WATER OVER THE NEXT 1 HOUR.  This is sometimes easier to drink if this solution is cold, so you can mix the solution a few hours ahead of time and place in the refrigerator prior to drinking.  You have to drink the solution within 24 hours of mixing it.  Do NOT put this solution over ice.  It IS ok to drink with a straw.    3.  The endoscopy department will call you 2 days before your colonoscopy to tell you the exact time to arrive, AND to tell you the exact time to drink the 2nd portion of your prep (which will be FIVE HOURS BEFORE YOUR ARRIVAL TIME).  At this time given to you, POUR ONE (1) BOTTLE OF SUPREP INTO THE MIXING CONTAINER, PROVIDED  INSIDE THE BOX.  ADD WATER TO THE LINE ON THE CONTAINER AND MIX IT WELL.  DRINK THE ENTIRE CONTAINER AND THEN DRINK TWO (2) MORE CONTAINERS OF WATER OVER THE NEXT 1 HOUR.  This is sometimes easier to drink if this solution is cold, so you can mix the solution a few hours ahead of time and place in the refrigerator prior to drinking.  You have to drink the solution within 24 hours of mixing it.  Do NOT put this solution over ice.  It IS ok to drink with a straw. Once this is complete, you may not have ANYTHING else by mouth!    4.  You must have someone with you to DRIVE YOU HOME since you will be receiving IV sedation for the colonoscopy.    5.  It is ok to take your heart, blood pressure, and seizure medications in the morning of your test with a SIP of water.  Hold other medications until after your procedure.  Do NOT have anything else to eat or drink the morning of your colonoscopy.  It is ok to brush your teeth.    6.  If you are on blood thinners THAT YOU HAVE BEEN INSTRUCTED TO HOLD BY YOUR DOCTOR FOR THIS PROCEDURE, then do NOT take this the morning of your colonoscopy.  Do NOT stop these medications on your own, they must be approved to be held by your doctor.  Your colonoscopy can NOT be done if you are on these medications.  Examples of blood thinners include: Coumadin, Aggrenox, Plavix, Pradaxa, Reapro, Pletal, Xarelto, Ticagrelor, Brilinta, Eliquis, and high dose aspirin (325 mg).  You do not have to stop baby aspirin 81 mg.    7.  IF YOU ARE DIABETIC:  NO INSULIN OR ORAL MEDICATIONS THE MORNING OF THE COLONOSCOPY.  TAKE ONLY HALF THE DOSE OF YOUR INSULIN THE DAY BEFORE THE COLONOSCOPY.  DO NOT TAKE ANY ORAL DIABETIC MEDICATIONS THE DAY BEFORE THE COLONOSCOPY.  IF YOU ARE AN INSULIN DEPENDENT DIABETIC WITH UNSTABLE BLOOD SUGARDS, NOTIFY YOUR PRIMARY CARE PHYSICIAN FOR INSTRUCTIONS.    Donna notifmarek written English/Guamanian  instructions will be mailed to pt's residence.  Donna repeated instructions  correctly.

## 2018-09-27 ENCOUNTER — TELEPHONE (OUTPATIENT)
Dept: ENDOSCOPY | Facility: HOSPITAL | Age: 48
End: 2018-09-27

## 2018-09-27 ENCOUNTER — TELEPHONE (OUTPATIENT)
Dept: NEUROLOGY | Facility: HOSPITAL | Age: 48
End: 2018-09-27

## 2018-09-27 NOTE — TELEPHONE ENCOUNTER
Spoke with patient about arrival time @. 0915.     Prep instructions reviewed: the day before the procedure, follow a clear liquid diet all day, then start the first 1/2 of prep at 5pm and take 2nd 1/2 of prep @  0300        .    Medications: Do not take Insulin or oral diabetic medications the day of the procedure.  Take as prescribed: heart, seizure and blood pressure medication in the morning with a sip of water (less than an ounce).  Take any breathing medications and bring inhalers to hospital with you Leave all valuables and jewelry at home.     Wear comfortable clothes to procedure to change into hospital gown You cannot drive for 24 hours after your procedure because you will receive sedation for your procedure to make you comfortable.  A ride must be provided at discharge.

## 2018-09-27 NOTE — TELEPHONE ENCOUNTER
Suprep colon prep to [facility] at [address] [facility], at [phone].  Take as directed with no refills.

## 2018-09-27 NOTE — TELEPHONE ENCOUNTER
Cony, friend, notified colon prep sent to [facility] at [address] [place] [address].  [facility] closed.

## 2018-09-27 NOTE — TELEPHONE ENCOUNTER
----- Message from Vika Cade RN sent at 9/27/2018  1:56 PM CDT -----  Spoke to patient, states she has not received prescription for prep at pharmacy. Patient is scheduled for colonoscopy on Monday 10/1/18. Thanks

## 2018-09-28 ENCOUNTER — TELEPHONE (OUTPATIENT)
Dept: ENDOSCOPY | Facility: HOSPITAL | Age: 48
End: 2018-09-28

## 2018-09-28 NOTE — TELEPHONE ENCOUNTER
Spoke with patient about arrival time @ 0745  .     Prep instructions reviewed: the day before the procedure, follow a clear liquid diet all day, then start the first 1/2 of prep at 5pm and take 2nd 1/2 of prep @ 0145.  Pt must be completely NPO when prep completed @ 0545 .    Medications: Do not take Insulin or oral diabetic medications the day of the procedure.  Take as prescribed: heart, seizure and blood pressure medication in the morning with a sip of water (less than an ounce).  Take any breathing medications and bring inhalers to hospital with you Leave all valuables and jewelry at home.     Wear comfortable clothes to procedure to change into hospital gown You cannot drive for 24 hours after your procedure because you will receive sedation for your procedure to make you comfortable.  A ride must be provided at discharge.

## 2018-10-01 ENCOUNTER — ANESTHESIA EVENT (OUTPATIENT)
Dept: ENDOSCOPY | Facility: HOSPITAL | Age: 48
End: 2018-10-01
Payer: MEDICARE

## 2018-10-01 ENCOUNTER — ANESTHESIA (OUTPATIENT)
Dept: ENDOSCOPY | Facility: HOSPITAL | Age: 48
End: 2018-10-01
Payer: MEDICARE

## 2018-10-01 ENCOUNTER — HOSPITAL ENCOUNTER (OUTPATIENT)
Facility: HOSPITAL | Age: 48
Discharge: HOME | End: 2018-10-01
Attending: INTERNAL MEDICINE | Admitting: INTERNAL MEDICINE
Payer: MEDICARE

## 2018-10-01 VITALS
HEIGHT: 62 IN | WEIGHT: 180 LBS | OXYGEN SATURATION: 98 % | TEMPERATURE: 98 F | BODY MASS INDEX: 33.13 KG/M2 | RESPIRATION RATE: 14 BRPM | DIASTOLIC BLOOD PRESSURE: 46 MMHG | SYSTOLIC BLOOD PRESSURE: 102 MMHG | HEART RATE: 93 BPM

## 2018-10-01 DIAGNOSIS — K63.5 POLYP OF COLON, UNSPECIFIED PART OF COLON, UNSPECIFIED TYPE: ICD-10-CM

## 2018-10-01 DIAGNOSIS — R93.3 ABNORMAL FINDING ON GI TRACT IMAGING: Primary | ICD-10-CM

## 2018-10-01 LAB
GLUCOSE SERPL-MCNC: 162 MG/DL (ref 70–110)
POCT GLUCOSE: 162 MG/DL (ref 70–110)

## 2018-10-01 PROCEDURE — 63600175 PHARM REV CODE 636 W HCPCS: Performed by: NURSE ANESTHETIST, CERTIFIED REGISTERED

## 2018-10-01 PROCEDURE — 37000009 HC ANESTHESIA EA ADD 15 MINS: Performed by: INTERNAL MEDICINE

## 2018-10-01 PROCEDURE — 45380 COLONOSCOPY AND BIOPSY: CPT | Performed by: INTERNAL MEDICINE

## 2018-10-01 PROCEDURE — 25000003 PHARM REV CODE 250: Performed by: INTERNAL MEDICINE

## 2018-10-01 PROCEDURE — 63600175 PHARM REV CODE 636 W HCPCS: Performed by: INTERNAL MEDICINE

## 2018-10-01 PROCEDURE — 88305 TISSUE EXAM BY PATHOLOGIST: CPT | Performed by: PATHOLOGY

## 2018-10-01 PROCEDURE — 27201012 HC FORCEPS, HOT/COLD, DISP: Performed by: INTERNAL MEDICINE

## 2018-10-01 PROCEDURE — 88305 TISSUE EXAM BY PATHOLOGIST: CPT | Mod: 26,,, | Performed by: PATHOLOGY

## 2018-10-01 PROCEDURE — 37000008 HC ANESTHESIA 1ST 15 MINUTES: Performed by: INTERNAL MEDICINE

## 2018-10-01 RX ORDER — PROPOFOL 10 MG/ML
VIAL (ML) INTRAVENOUS CONTINUOUS PRN
Status: DISCONTINUED | OUTPATIENT
Start: 2018-10-01 | End: 2018-10-01

## 2018-10-01 RX ORDER — LIDOCAINE HCL/PF 100 MG/5ML
SYRINGE (ML) INTRAVENOUS
Status: DISCONTINUED | OUTPATIENT
Start: 2018-10-01 | End: 2018-10-01

## 2018-10-01 RX ORDER — SODIUM CHLORIDE 0.9 % (FLUSH) 0.9 %
3 SYRINGE (ML) INJECTION
Status: DISCONTINUED | OUTPATIENT
Start: 2018-10-01 | End: 2018-10-01 | Stop reason: HOSPADM

## 2018-10-01 RX ORDER — PROPOFOL 10 MG/ML
VIAL (ML) INTRAVENOUS
Status: DISCONTINUED | OUTPATIENT
Start: 2018-10-01 | End: 2018-10-01

## 2018-10-01 RX ORDER — SODIUM CHLORIDE 9 MG/ML
INJECTION, SOLUTION INTRAVENOUS CONTINUOUS
Status: DISCONTINUED | OUTPATIENT
Start: 2018-10-01 | End: 2018-10-01 | Stop reason: HOSPADM

## 2018-10-01 RX ORDER — ONDANSETRON 2 MG/ML
4 INJECTION INTRAMUSCULAR; INTRAVENOUS ONCE
Status: COMPLETED | OUTPATIENT
Start: 2018-10-01 | End: 2018-10-01

## 2018-10-01 RX ORDER — DEXTROMETHORPHAN/PSEUDOEPHED 2.5-7.5/.8
40 DROPS ORAL ONCE
Status: COMPLETED | OUTPATIENT
Start: 2018-10-01 | End: 2018-10-01

## 2018-10-01 RX ADMIN — PROPOFOL 150 MCG/KG/MIN: 10 INJECTION, EMULSION INTRAVENOUS at 09:10

## 2018-10-01 RX ADMIN — SIMETHICONE 40 MG: 20 SUSPENSION/ DROPS ORAL at 10:10

## 2018-10-01 RX ADMIN — PROPOFOL 50 MG: 10 INJECTION, EMULSION INTRAVENOUS at 09:10

## 2018-10-01 RX ADMIN — SODIUM CHLORIDE: 0.9 INJECTION, SOLUTION INTRAVENOUS at 09:10

## 2018-10-01 RX ADMIN — LIDOCAINE HYDROCHLORIDE 50 MG: 20 INJECTION, SOLUTION INTRAVENOUS at 09:10

## 2018-10-01 RX ADMIN — PROPOFOL 20 MG: 10 INJECTION, EMULSION INTRAVENOUS at 09:10

## 2018-10-01 RX ADMIN — ONDANSETRON 4 MG: 2 INJECTION INTRAMUSCULAR; INTRAVENOUS at 10:10

## 2018-10-01 NOTE — TRANSFER OF CARE
"Anesthesia Transfer of Care Note    Patient: Therese Ann    Procedure(s) Performed: Procedure(s) (LRB):  COLONOSCOPY (N/A)    Patient location: GI    Anesthesia Type: MAC    Transport from OR: Transported from OR on room air with adequate spontaneous ventilation    Post pain: adequate analgesia    Post assessment: no apparent anesthetic complications    Post vital signs: stable    Level of consciousness: awake, alert and oriented    Nausea/Vomiting: no nausea/vomiting    Complications: none    Transfer of care protocol was followed      Last vitals:   Visit Vitals  /69   Pulse 90   Temp 36.6 °C (97.9 °F) (Temporal)   Resp 17   Ht 5' 2" (1.575 m)   Wt 81.6 kg (180 lb)   SpO2 96%   Breastfeeding? No   BMI 32.92 kg/m²     "

## 2018-10-01 NOTE — PLAN OF CARE
Admission process complete. Patient ready for procedure. Plan of care reviewed with patient. Preoperative fasting appropriate for procedure and sedation. Call light in reach and bed in lowest position.

## 2018-10-01 NOTE — PLAN OF CARE
Recovery complete. Patient recovered to baseline. Discharge instructions reviewed with patient. VSS.

## 2018-10-01 NOTE — H&P
[facility]    CC: abnormal imaging      HPI 47 y.o. female with intermittent abdominal pain and had a recent CT with mucosal enhancement of the transverse colon with etiology unclear, but consistent with inflammatory vs. Infectious etiology.  She denies any diarrhea associated with these findings. No other significant findings otherwise.      She has had colonoscopy on 8/13/18 with poor prep and was advised repeat.     Past Medical History:   Diagnosis Date    Depression     Diabetes mellitus     Hypertension     Migraine     Schizophrenia      Review of Systems  General ROS: negative for chills, fever or weight loss  Cardiovascular ROS: no chest pain or dyspnea on exertion  Gastrointestinal ROS: no abdominal pain, change in bowel habits, or black/ bloody stools    Physical Examination  General appearance: alert, cooperative, no distress  HENT: Normocephalic, atraumatic, neck symmetrical, no nasal discharge   Lungs: clear to auscultation bilaterally, no dullness to percussion bilaterally  Heart: regular rate and rhythm without rub; no displacement of the PMI   Abdomen: soft, non-tender; bowel sounds normoactive; no organomegaly  Extremities: extremities symmetric; no clubbing, cyanosis, or edema  Neurologic: Alert and oriented X 3, normal strength, normal coordination and gait    Assessment:   47 year old female with chronic, intermittent abdominal pain with nausea and abnormal imaging of the transverse colon noted on CT. Her CT was ordered due to a protuberant abdomen which is fairly impressive on exam but her abdomen has been distended like this for years (per patient report).     Plan:  -Colonoscopy today    Jean Cintron MD   [address], [address]   [place] [address] [phone]

## 2018-10-01 NOTE — [employer] PATIENT INSTRUCTIONS
Discharge Summary/Instructions after an Endoscopic Procedure  Patient Name: Therese Ann  Patient MRN: [id number]  Patient YOB: 1970 Monday, October 01, 2018  Jean Cintron MD  RESTRICTIONS:  During your procedure today, you received medications for sedation.  These   medications may affect your judgment, balance and coordination.  Therefore,   for 24 hours, you have the following restrictions:   - DO NOT drive a car, operate machinery, make legal/financial decisions,   sign important papers or drink alcohol.    ACTIVITY:  Today: no heavy lifting, straining or running due to procedural   sedation/anesthesia.  The following day: return to full activity including work.  DIET:  Eat and drink normally unless instructed otherwise.     TREATMENT FOR COMMON SIDE EFFECTS:  - Mild abdominal pain, nausea, belching, bloating or excessive gas:  rest,   eat lightly and use a heating pad.  - Sore Throat: treat with throat lozenges and/or gargle with warm salt   water.  - Because air was used during the procedure, expelling large amounts of air   from your rectum or belching is normal.  - If a bowel prep was taken, you may not have a bowel movement for 1-3 days.    This is normal.  SYMPTOMS TO WATCH FOR AND REPORT TO YOUR PHYSICIAN:  1. Abdominal pain or bloating, other than gas cramps.  2. Chest pain.  3. Back pain.  4. Signs of infection such as: chills or fever occurring within 24 hours   after the procedure.  5. Rectal bleeding, which would show as bright red, maroon, or black stools.   (A tablespoon of blood from the rectum is not serious, especially if   hemorrhoids are present.)  6. Vomiting.  7. Weakness or dizziness.  GO DIRECTLY TO THE NEAREST EMERGENCY ROOM IF YOU HAVE ANY OF THE FOLLOWING:      Difficulty breathing              Chills and/or fever over 101 F   Persistent vomiting and/or vomiting blood   Severe abdominal pain   Severe chest pain   Black, tarry stools   Bleeding- more than one  tablespoon   Any other symptom or condition that you feel may need urgent attention  Your doctor recommends these additional instructions:  If any biopsies were taken, your doctors clinic will contact you in 1 to 2   weeks with any results.  Discharge to home   Condition stable   Resume previous diet   Would recommend repeat colonoscopy in 5 years if polyp returns adenomatous  The signs and symptoms of potential delayed complications were discussed   with the patient. If signs or symptoms of these complications develop, call   the [facility] at 1 [phone].   Return to normal activities tomorrow.  Written discharge instructions were   provided to the patient.   For questions, problems or results please call your physician - Jean Cintron MD at Work:  [phone].  EMERGENCY PHONE NUMBER: [phone],  LAB RESULTS: [phone]  IF A COMPLICATION OR EMERGENCY SITUATION ARISES AND YOU ARE UNABLE TO REACH   YOUR PHYSICIAN - GO DIRECTLY TO THE EMERGENCY ROOM.  MD Jean Shin MD  10/1/2018 9:58:11 AM  This report has been verified and signed electronically.  [employer]

## 2018-10-01 NOTE — ANESTHESIA POSTPROCEDURE EVALUATION
"Anesthesia Post Evaluation    Patient: Therese Ann    Procedure(s) Performed: Procedure(s) (LRB):  COLONOSCOPY (N/A)    Final Anesthesia Type: MAC  Patient location during evaluation: GI PACU  Patient participation: Yes- Able to Participate  Level of consciousness: awake and alert and oriented  Post-procedure vital signs: reviewed and stable  Pain management: adequate  Airway patency: patent  PONV status at discharge: No PONV  Anesthetic complications: no      Cardiovascular status: blood pressure returned to baseline and hemodynamically stable  Respiratory status: unassisted, spontaneous ventilation and room air  Hydration status: euvolemic  Follow-up not needed.        Visit Vitals  /69   Pulse 90   Temp 36.6 °C (97.9 °F) (Temporal)   Resp 17   Ht 5' 2" (1.575 m)   Wt 81.6 kg (180 lb)   SpO2 96%   Breastfeeding? No   BMI 32.92 kg/m²       Pain/Enrique Score: Pain Assessment Performed: Yes (10/1/2018  8:56 AM)        "

## 2018-10-01 NOTE — ANESTHESIA PREPROCEDURE EVALUATION
10/01/2018  Therese Ann is a 48 y.o., female having lower endo for eval GI symptoms / abnormal CT (transverse colon)    Anesthesia Evaluation    I have reviewed the Patient Summary Reports.    I have reviewed the Nursing Notes.   I have reviewed the Medications.     Review of Systems  Anesthesia Hx:  No problems with previous Anesthesia   Denies Personal Hx of Anesthesia complications.   Cardiovascular:   Hypertension    Neurological:   Headaches    Endocrine:   Diabetes    Psych:   Psychiatric History          Physical Exam  General:  Well nourished    Airway/Jaw/Neck:  Airway Findings: Mouth Opening: Normal Tongue: Normal  General Airway Assessment: Adult  Mallampati: III  Improves to II with phonation.  TM Distance: Normal, at least 6 cm  Jaw/Neck Findings:  Neck ROM: Normal ROM     Eyes/Ears/Nose:  Eyes/Ears/Nose Findings:     Chest/Lungs:  Chest/Lungs Findings: Clear to auscultation, Normal Respiratory Rate     Heart/Vascular:  Heart Findings: Rate: Normal  Rhythm: Regular Rhythm  Sounds: Normal        Mental Status:  Mental Status Findings:  Alert and Oriented         Anesthesia Plan  Type of Anesthesia, risks & benefits discussed:  Anesthesia Type:  MAC  Patient's Preference:   Intra-op Monitoring Plan:   Intra-op Monitoring Plan Comments:   Post Op Pain Control Plan:   Post Op Pain Control Plan Comments:   Induction:   IV  Beta Blocker:         Informed Consent: Patient understands risks and agrees with Anesthesia plan.  Questions answered. Anesthesia consent signed with patient.  ASA Score: 2     Day of Surgery Review of History & Physical: I have interviewed and examined the patient. I have reviewed the patient's H&P dated:            Ready For Surgery From Anesthesia Perspective.

## 2018-10-04 ENCOUNTER — TELEPHONE (OUTPATIENT)
Dept: NEUROLOGY | Facility: HOSPITAL | Age: 48
End: 2018-10-04

## 2018-10-04 NOTE — TELEPHONE ENCOUNTER
Patient unable to be reached, VoiceMail left with clinic office number. Patient will be calling the clinic back regarding her colonoscopy results. A single adenomatous polyp was visualized and biopsied. It is negative for malignancy. Patient to return for another colonoscopy in 5 years.

## 2023-09-21 ENCOUNTER — HOSPITAL ENCOUNTER (EMERGENCY)
Facility: HOSPITAL | Age: 53
Discharge: PSYCHIATRIC OR SUBSTANCE USE FACILITY | End: 2023-09-22
Attending: EMERGENCY MEDICINE
Payer: MEDICARE

## 2023-09-21 DIAGNOSIS — R10.9 ABDOMINAL PAIN: ICD-10-CM

## 2023-09-21 DIAGNOSIS — R45.851 SUICIDAL IDEATION: ICD-10-CM

## 2023-09-21 DIAGNOSIS — K59.00 CONSTIPATION, UNSPECIFIED CONSTIPATION TYPE: Primary | ICD-10-CM

## 2023-09-21 LAB
ALBUMIN SERPL BCP-MCNC: 3.3 G/DL (ref 3.5–5.2)
ALP SERPL-CCNC: 133 U/L (ref 55–135)
ALT SERPL W/O P-5'-P-CCNC: 27 U/L (ref 10–44)
ANION GAP SERPL CALC-SCNC: 10 MMOL/L (ref 8–16)
AST SERPL-CCNC: 15 U/L (ref 10–40)
BACTERIA #/AREA URNS AUTO: NORMAL /HPF
BASOPHILS # BLD AUTO: 0.04 K/UL (ref 0–0.2)
BASOPHILS NFR BLD: 0.4 % (ref 0–1.9)
BILIRUB SERPL-MCNC: 0.3 MG/DL (ref 0.1–1)
BILIRUB UR QL STRIP: NEGATIVE
BUN SERPL-MCNC: 12 MG/DL (ref 6–20)
CALCIUM SERPL-MCNC: 9.6 MG/DL (ref 8.7–10.5)
CHLORIDE SERPL-SCNC: 105 MMOL/L (ref 95–110)
CLARITY UR REFRACT.AUTO: CLEAR
CO2 SERPL-SCNC: 24 MMOL/L (ref 23–29)
COLOR UR AUTO: YELLOW
CREAT SERPL-MCNC: 0.7 MG/DL (ref 0.5–1.4)
DIFFERENTIAL METHOD: ABNORMAL
EOSINOPHIL # BLD AUTO: 0.2 K/UL (ref 0–0.5)
EOSINOPHIL NFR BLD: 1.6 % (ref 0–8)
ERYTHROCYTE [DISTWIDTH] IN BLOOD BY AUTOMATED COUNT: 13.5 % (ref 11.5–14.5)
EST. GFR  (NO RACE VARIABLE): >60 ML/MIN/1.73 M^2
GLUCOSE SERPL-MCNC: 132 MG/DL (ref 70–110)
GLUCOSE UR QL STRIP: NEGATIVE
HCT VFR BLD AUTO: 39.2 % (ref 37–48.5)
HCV AB SERPL QL IA: NORMAL
HGB BLD-MCNC: 12.9 G/DL (ref 12–16)
HGB UR QL STRIP: NEGATIVE
HIV 1+2 AB+HIV1 P24 AG SERPL QL IA: NORMAL
HYALINE CASTS UR QL AUTO: 0 /LPF
IMM GRANULOCYTES # BLD AUTO: 0.07 K/UL (ref 0–0.04)
IMM GRANULOCYTES NFR BLD AUTO: 0.6 % (ref 0–0.5)
KETONES UR QL STRIP: NEGATIVE
LACTATE SERPL-SCNC: 2.2 MMOL/L (ref 0.5–2.2)
LEUKOCYTE ESTERASE UR QL STRIP: NEGATIVE
LIPASE SERPL-CCNC: 9 U/L (ref 4–60)
LYMPHOCYTES # BLD AUTO: 3.1 K/UL (ref 1–4.8)
LYMPHOCYTES NFR BLD: 27.3 % (ref 18–48)
MCH RBC QN AUTO: 31.2 PG (ref 27–31)
MCHC RBC AUTO-ENTMCNC: 32.9 G/DL (ref 32–36)
MCV RBC AUTO: 95 FL (ref 82–98)
MICROSCOPIC COMMENT: NORMAL
MONOCYTES # BLD AUTO: 0.9 K/UL (ref 0.3–1)
MONOCYTES NFR BLD: 7.8 % (ref 4–15)
NEUTROPHILS # BLD AUTO: 7.1 K/UL (ref 1.8–7.7)
NEUTROPHILS NFR BLD: 62.3 % (ref 38–73)
NITRITE UR QL STRIP: NEGATIVE
NRBC BLD-RTO: 0 /100 WBC
PH UR STRIP: 6 [PH] (ref 5–8)
PLATELET # BLD AUTO: 295 K/UL (ref 150–450)
PMV BLD AUTO: 9.5 FL (ref 9.2–12.9)
POTASSIUM SERPL-SCNC: 3.8 MMOL/L (ref 3.5–5.1)
PROT SERPL-MCNC: 7 G/DL (ref 6–8.4)
PROT UR QL STRIP: ABNORMAL
RBC # BLD AUTO: 4.13 M/UL (ref 4–5.4)
RBC #/AREA URNS AUTO: 2 /HPF (ref 0–4)
SODIUM SERPL-SCNC: 139 MMOL/L (ref 136–145)
SP GR UR STRIP: 1.02 (ref 1–1.03)
SQUAMOUS #/AREA URNS AUTO: 6 /HPF
URN SPEC COLLECT METH UR: ABNORMAL
WBC # BLD AUTO: 11.37 K/UL (ref 3.9–12.7)
WBC #/AREA URNS AUTO: 2 /HPF (ref 0–5)

## 2023-09-21 PROCEDURE — 99285 EMERGENCY DEPT VISIT HI MDM: CPT | Mod: 25

## 2023-09-21 PROCEDURE — 81001 URINALYSIS AUTO W/SCOPE: CPT | Performed by: EMERGENCY MEDICINE

## 2023-09-21 PROCEDURE — 85025 COMPLETE CBC W/AUTO DIFF WBC: CPT | Performed by: EMERGENCY MEDICINE

## 2023-09-21 PROCEDURE — 87389 HIV-1 AG W/HIV-1&-2 AB AG IA: CPT | Performed by: PHYSICIAN ASSISTANT

## 2023-09-21 PROCEDURE — 80053 COMPREHEN METABOLIC PANEL: CPT | Performed by: EMERGENCY MEDICINE

## 2023-09-21 PROCEDURE — 86803 HEPATITIS C AB TEST: CPT | Performed by: PHYSICIAN ASSISTANT

## 2023-09-21 PROCEDURE — 83690 ASSAY OF LIPASE: CPT | Performed by: EMERGENCY MEDICINE

## 2023-09-21 PROCEDURE — 93010 EKG 12-LEAD: ICD-10-PCS | Mod: ,,, | Performed by: INTERNAL MEDICINE

## 2023-09-21 PROCEDURE — 25000003 PHARM REV CODE 250

## 2023-09-21 PROCEDURE — 25500020 PHARM REV CODE 255: Performed by: EMERGENCY MEDICINE

## 2023-09-21 PROCEDURE — 83605 ASSAY OF LACTIC ACID: CPT

## 2023-09-21 PROCEDURE — 93005 ELECTROCARDIOGRAM TRACING: CPT

## 2023-09-21 PROCEDURE — 93010 ELECTROCARDIOGRAM REPORT: CPT | Mod: ,,, | Performed by: INTERNAL MEDICINE

## 2023-09-21 RX ORDER — POLYETHYLENE GLYCOL 3350 17 G/17G
17 POWDER, FOR SOLUTION ORAL DAILY
Qty: 10 EACH | Refills: 0 | Status: SHIPPED | OUTPATIENT
Start: 2023-09-21 | End: 2023-10-01

## 2023-09-21 RX ORDER — PSEUDOEPHEDRINE/ACETAMINOPHEN 30MG-500MG
100 TABLET ORAL
Status: COMPLETED | OUTPATIENT
Start: 2023-09-21 | End: 2023-09-22

## 2023-09-21 RX ORDER — SYRING-NEEDL,DISP,INSUL,0.3 ML 29 G X1/2"
296 SYRINGE, EMPTY DISPOSABLE MISCELLANEOUS
Status: COMPLETED | OUTPATIENT
Start: 2023-09-21 | End: 2023-09-22

## 2023-09-21 RX ADMIN — IOHEXOL 75 ML: 350 INJECTION, SOLUTION INTRAVENOUS at 10:09

## 2023-09-21 RX ADMIN — SODIUM CHLORIDE 500 ML: 0.9 INJECTION, SOLUTION INTRAVENOUS at 11:09

## 2023-09-22 VITALS
SYSTOLIC BLOOD PRESSURE: 115 MMHG | WEIGHT: 179.88 LBS | TEMPERATURE: 98 F | HEART RATE: 93 BPM | RESPIRATION RATE: 17 BRPM | HEIGHT: 62 IN | OXYGEN SATURATION: 95 % | DIASTOLIC BLOOD PRESSURE: 65 MMHG | BODY MASS INDEX: 33.1 KG/M2

## 2023-09-22 PROCEDURE — 25000003 PHARM REV CODE 250

## 2023-09-22 RX ORDER — POLYETHYLENE GLYCOL 3350 17 G/17G
17 POWDER, FOR SOLUTION ORAL 2 TIMES DAILY PRN
Qty: 30 PACKET | Refills: 0 | Status: SHIPPED | OUTPATIENT
Start: 2023-09-22

## 2023-09-22 RX ADMIN — BE HEALTH MAGNESIUM CITRATE ORAL SOLUTION - LEMON 296 ML: 1.75 LIQUID ORAL at 02:09

## 2023-09-22 RX ADMIN — Medication 100 ML: at 02:09

## 2023-09-22 NOTE — DISCHARGE INSTRUCTIONS
Workup overall unremarkable except for a significant stool burden on CT scan.  Given enema in the emergency department, recommend starting MiraLax 2 times a day until good bowel movement.

## 2023-09-22 NOTE — PROVIDER PROGRESS NOTES - EMERGENCY DEPT.
"Encounter Date: 9/21/2023    ED Physician Progress Notes            ED Resident HAND-OFF NOTE:  Therese Ann is a 53 y.o. female who presented to the ED on 9/21/2023, patient C/O abdominal pain and distension. I assumed care of patient from off-going ED physician team patient pending CT abdomen and pelvis.    On my evaluation, Therese Ann appears well, hemodynamically stable and in NAD. Thus far, Therese Ann has received:  Medications   iohexoL (OMNIPAQUE 350) injection 75 mL (75 mLs Intravenous Given 9/21/23 2218)       /79   Pulse 92   Temp 97.6 °F (36.4 °C) (Oral)   Resp 17   Ht 5' 2" (1.575 m)   Wt 81.6 kg (179 lb 14.3 oz)   SpO2 98%   BMI 32.90 kg/m²         Disposition: I anticipate patient will discharge back to [facility].  ______________________  Chaitanya Correa MD   Emergency Medicine Resident      UPDATE:   CT scan results in evidence of extensive constipation.  Will provide enema in hospital and discharge with Miralax.       :  Abdominal pain       Attending Note:  I have seen the patient, have repeated the key portions of the history and physical, reviewed and agree with the medical documentation, and supervised and managed the medical care of the patient. Additionally, I was present for the critical portion of any procedure(s) performed.    Patient received in turnover pending CT abdomen pelvis.  Briefly, 53-year-old female sent from [facility] for abdominal pain.  CT scan concerning for significant stool burden.  Patient treated with enema, plan to start MiraLax.  Patient is medically cleared stable for psychiatric transfer back to [facility] for continued psychiatric care.    CHRIS Tam MD  Staff ED Physician  09/22/2023 12:08 AM      "

## 2023-09-22 NOTE — ED NOTES
Therese Ann, a 53 y.o. female presents to the ED w/ complaint of ABD PAIN AND DISTENTION. WENT TO [facility] AS A FVA FOR SI AND DEPRESSION. STATES SHE WANTED TO OD AND CUT HERSELF. LOST HER  7 YEARS AGO AFTER BEING  25 YEARS. PT IS CALM AND COOPERATIVE. AAO X 4 RESP EVEN AND UNLABORED. PT ARRIVES WITH A PAPER BAG THAT CONTAINS CIGARETTES. A PIC OF HER  AND NOTEBOOK. PT ALLOWED TO KEEP PICTURE AND SMALL YELLOW NOTEBOOK. CIGS KEPT WITH TECH AT THIS TIME. PT VERBALIZES UNDERSTANDING.  PLACED ON CONT CARDIAC, BP AND O2 MONITOR. HERE FOR MEDICAL CLEARANCE BACK TO [facility].     Triage note:  Chief Complaint   Patient presents with    Abdominal Pain     RLQ abdominal pain x 1 hr. Denies N/V. Abdomen appears rigid and distended. Denies CP, SOB. Pt SVA from [place] for SI.     Review of patient's allergies indicates:  No Known Allergies  Past Medical History:   Diagnosis Date    Depression     Diabetes mellitus     Hypertension     Migraine     Schizophrenia

## 2023-09-22 NOTE — ED NOTES
Brown bomb enema administered without difficulty. Pt holding enema. BSC at bedside and nurse call given

## 2023-09-22 NOTE — ED NOTES
Pt remains in paper scrubs, resting in stretcher comfortably - with side rails up, locked, and in lowest position. Chest rise and fall noted; breathing equal, even, and unlabored. Sitter remains at bedside in direct visual contact, charting per protocol every 15 minutes. No equipment or belongings are in the patients room to prevent self harm or injury. Pt aware of plan of care. No needs expressed at this time. Will continue to assess periodically.

## 2023-09-22 NOTE — ED NOTES
Assumed care at this time. Report received- patient pending CT. Sitter at bedside. No needs at this time

## 2023-09-22 NOTE — ED NOTES
EMS here for transfer to [place]. Called [facility] to verify her bed and update on pt receiving enema. Proper paperwork sent with EMS

## 2023-09-22 NOTE — ED PROVIDER NOTES
"Encounter Date: 2023       History     Chief Complaint   Patient presents with    Abdominal Pain     RLQ abdominal pain x 1 hr. Denies N/V. Abdomen appears rigid and distended. Denies CP, SOB. Pt SVA from [place] for SI.     HPI    Therese Ann is a patient is a 53-year-old female with a PMHx of appendicitis s/p appendectomy (unknown date) and schizophrenia who presents to the emergency department today from [facility] for abdominal distention and generalized abdominal pain which is worst in the RLQ. Patient states that this has been worsening over the past 2hr, but has been present for a long time".  Patient states that she has been having regular bowel movements up until about 5 days ago, she is not had a bowel movement since. She has also been passing minimal flatus for a few days. She endorses N w/o V. Patient denies ingestion of any chemicals or foreign bodies, drug/EtOH use, fever, chills, headache, chest pain, shortness of breath, vomiting, dysuria, or previous hernias.  Of note, patient was admitted to [facility] today for depression and SI.      Review of patient's allergies indicates:  No Known Allergies  Past Medical History:   Diagnosis Date    Depression     Diabetes mellitus     Hypertension     Migraine     Schizophrenia      Past Surgical History:   Procedure Laterality Date    APPENDECTOMY      CARPAL TUNNEL RELEASE       SECTION      COLONOSCOPY N/A 2018    Procedure: COLONOSCOPY;  Surgeon: Jean Cintron MD;  Location: [facility];  Service: Endoscopy;  Laterality: N/A;    COLONOSCOPY N/A 10/1/2018    Procedure: COLONOSCOPY;  Surgeon: Jean Cintron MD;  Location: [facility];  Service: Endoscopy;  Laterality: N/A;    ESOPHAGOGASTRODUODENOSCOPY N/A 2018    Procedure: EGD (ESOPHAGOGASTRODUODENOSCOPY);  Surgeon: Jean Cintron MD;  Location: [facility];  Service: Endoscopy;  Laterality: N/A;     History reviewed. No pertinent family history.  Social History "     Tobacco Use    Smoking status: Some Days     Current packs/day: 0.25     Types: Cigarettes    Smokeless tobacco: Never   Substance Use Topics    Alcohol use: No    Drug use: No     Review of Systems  See HPI  Physical Exam     Initial Vitals [09/21/23 2057]   BP Pulse Resp Temp SpO2   (!) 137/90 95 18 97.6 °F (36.4 °C) 99 %      MAP       --         Physical Exam    Nursing note and vitals reviewed.  Constitutional: She appears well-developed and well-nourished. She is not diaphoretic. No distress.   HENT:   Head: Normocephalic and atraumatic.   Mouth/Throat: Oropharynx is clear and moist.   Neck: Neck supple.   Normal range of motion.  Cardiovascular:  Normal rate, regular rhythm, normal heart sounds and intact distal pulses.           Pulmonary/Chest: Breath sounds normal. No stridor. No respiratory distress. She has no wheezes.   Abdominal: Abdomen is protuberant. She exhibits distension. She exhibits no fluid wave and no pulsatile midline mass. No signs of injury. There is abdominal tenderness (generalized, but worst in RLQ). No hernia.   Abdomen is tympanic. When patient strains, she appears to have a ventral hernia; which is reducible.    No right CVA tenderness.  No left CVA tenderness. There is rigidity.   Musculoskeletal:      Cervical back: Normal range of motion and neck supple.     Neurological: She is alert and oriented to person, place, and time. No sensory deficit.   Skin: Skin is warm and dry. Capillary refill takes less than 2 seconds. No rash noted. No erythema.   Psychiatric: Her mood appears not anxious. Her affect is blunt. Her affect is not angry and not inappropriate. Her speech is delayed. She is slowed. She is not aggressive, not actively hallucinating and not combative. Cognition and memory are normal. She exhibits a depressed mood. She expresses suicidal (w/o plan) ideation.   Patient was recently admitted to [facility] for SI and depression She is attentive.         ED Course    Procedures  Labs Reviewed   CBC W/ AUTO DIFFERENTIAL - Abnormal; Notable for the following components:       Result Value    MCH 31.2 (*)     Immature Granulocytes 0.6 (*)     Immature Grans (Abs) 0.07 (*)     All other components within normal limits    Narrative:     Release to patient->Immediate   COMPREHENSIVE METABOLIC PANEL - Abnormal; Notable for the following components:    Glucose 132 (*)     Albumin 3.3 (*)     All other components within normal limits    Narrative:     Release to patient->Immediate   URINALYSIS, REFLEX TO URINE CULTURE - Abnormal; Notable for the following components:    Protein, UA 1+ (*)     All other components within normal limits    Narrative:     Specimen Source->Urine   HIV 1 / 2 ANTIBODY    Narrative:     Release to patient->Immediate   HEPATITIS C ANTIBODY    Narrative:     Release to patient->Immediate   LIPASE    Narrative:     Release to patient->Immediate   LACTIC ACID, PLASMA   URINALYSIS MICROSCOPIC    Narrative:     Specimen Source->Urine          Imaging Results              CT Abdomen Pelvis With Contrast (Final result)  Result time 09/21/23 23:11:12      Final result by Rivka Xavier MD (09/21/23 23:11:12)                   Impression:      1. Large volume of fecal material throughout the colon which can be seen with constipation.  Clinical correlation advised.  2. Small hiatal hernia  3. Mild bladder wall thickening likely due to nondistention/incomplete distension although correlation with urinalysis advised as this can be seen with cystitis.  4. Additional findings as above.      Electronically signed by: Rivka Xavier MD  Date:    09/21/2023  Time:    23:11               Narrative:    EXAMINATION:  CT ABDOMEN PELVIS WITH CONTRAST    CLINICAL HISTORY:  RLQ abdominal pain (Age >= 14y);    TECHNIQUE:  Low dose axial images, sagittal and coronal reformations were obtained from the lung bases to the pubic symphysis following the IV administration of 75 mL of  Omnipaque 350 .  No oral contrast.    COMPARISON:  None.    FINDINGS:  The visualized lung bases are free of pleural fluid or focal consolidation.  Visualized portions of the heart and pericardium are within normal limits.    The liver demonstrates no focal abnormality.  The portal vein and splenic vein are patent.  The spleen, pancreas and adrenal glands are unremarkable.  The gallbladder is distended although no mildly distended without calcified stone identified in its lumen.  No significant intra or extrahepatic biliary ductal dilatation.    The kidneys are normal in size and location and enhance symmetrically.  No evidence of hydronephrosis.  The urinary bladder demonstrates slight circumferential wall thickening which could relate to incomplete distension although correlation with urinalysis is advised as this can be seen with cystitis.  With the uterus is not definitively visualized, presumably surgically absent.  Multiple calcifications in the pelvis likely reflect phleboliths.  Adnexal regions are within normal limits.    The abdominal aorta is nonaneurysmal.  Shotty periaortic lymph nodes present.  Gas.    There is a small hiatal hernia.  The visualized loops of small bowel demonstrate no evidence of obstruction or inflammatory change.  There is a large volume of fecal material throughout the colon which can be seen with constipation.  The appendix is not definitively visualized.  Questionable suture line along the base of the cecum could reflect prior appendectomy.  Correlation with surgical history advised.  No right lower quadrant inflammatory change to suggest acute appendicitis.  There is no ascites, free intraperitoneal air or portal venous    Visualized osseous structures are intact.  Extraperitoneal soft tissues are within normal limits.                                       Medications   glycerin 99.5% topical solution 100 mL (has no administration in time range)     And   magnesium citrate  solution 296 mL (has no administration in time range)     And   sodium chloride 0.9% bolus 500 mL 500 mL (has no administration in time range)   iohexoL (OMNIPAQUE 350) injection 75 mL (75 mLs Intravenous Given 9/21/23 2218)     Medical Decision Making  Patient is a 53-year-old female who presented to the emergency department from a psychiatric facility for a 2 hour history of increasing abdominal distention and pain.  On initial evaluation, patient was vitals stable.  Patient's examination did reveal a significantly distended abdomen which was tender to palpation as well as a ventral hernia which was reducible.  Given the patient's history and physical exam, there were concerns for potential small-bowel obstruction.  Subsequent laboratory results revealed no leukocytosis or signs of infection.  CMP revealed no significant electrolyte abnormalities.  A CT was obtained in order to evaluate for potential obstruction or strangulated hernia.Large volume of stool found in bowel Without obvious obstruction; Small hernia also identified.  At this time concern for significant bowel obstruction or strangulated hernia is decreased.  Given the findings stated above, we decided that the most appropriate course of action was an enema in the emergency department with likely discharge back to the psychiatric facility.  Patient was handed off to oncoming team pending enema and discharge.    Disposition:  Most likely back to psychiatric facility with a prescription for MiraLax and instructions for further management of constipation.    Amount and/or Complexity of Data Reviewed  External Data Reviewed: labs and notes.  Labs: ordered. Decision-making details documented in ED Course.  Radiology: ordered. Decision-making details documented in ED Course.  ECG/medicine tests: ordered. Decision-making details documented in ED Course.    Risk  OTC drugs.  Prescription drug management.             ED Course as of 09/22/23 0039   Thu Sep 21,  2023 2158 Urinalysis, Reflex to Urine Culture Urine, Clean Catch(!)  Within normal limits [RN]   2211 CBC W/ AUTO DIFFERENTIAL(!)  WNL. Decreased concern for acute infectious process. [RN]   2212 Comp. Metabolic Panel(!)  WNL [RN]   2212 Lipase  WNL [RN]   Fri Sep 22, 2023   0000 CT Abdomen Pelvis With Contrast  Large volume of stool found in bowel Without obvious obstruction.  Small hernia also identified.  At this time concern for significant bowel obstruction is decreased. [RN]   0001 Brown bomb enema ordered [RN]   0009 Patient handed off to oncoming team pending enema and discharge back to psychiatric facility. [RN]      ED Course User Index  [RN] Prasanna Patterson MD       Medically cleared for psychiatry placement: 9/22/2023 12:06 AM            Clinical Impression:   Final diagnoses:  [R10.9] Abdominal pain  [K59.00] Constipation, unspecified constipation type (Primary)  [R45.851] Suicidal ideation        ED Disposition Condition    Transfer to Psych Facility Stable          ED Prescriptions       Medication Sig Dispense Start Date End Date Auth. Provider    polyethylene glycol (GLYCOLAX) 17 gram PwPk Take 17 g by mouth once daily. for 10 days 10 each 9/21/2023 10/1/2023 Prasanna Patterson MD    polyethylene glycol (GLYCOLAX) 17 gram PwPk Take 17 g by mouth 2 (two) times daily as needed. 30 packet 9/22/2023 -- Neda Tam MD          Follow-up Information       Follow up With Specialties Details Why Contact Info        Transfer back to [facility] for continued inpatient psychiatric treatment             Prasanna Patterson MD  Resident  09/22/23 0039